# Patient Record
Sex: FEMALE | Race: WHITE | NOT HISPANIC OR LATINO | Employment: FULL TIME
[De-identification: names, ages, dates, MRNs, and addresses within clinical notes are randomized per-mention and may not be internally consistent; named-entity substitution may affect disease eponyms.]

---

## 2017-03-30 DIAGNOSIS — I10 ESSENTIAL HYPERTENSION WITH GOAL BLOOD PRESSURE LESS THAN 140/90: ICD-10-CM

## 2017-03-30 RX ORDER — HYDROCHLOROTHIAZIDE 25 MG/1
TABLET ORAL
Qty: 90 TABLET | Refills: 2 | Status: SHIPPED | OUTPATIENT
Start: 2017-03-30 | End: 2017-10-12

## 2017-03-30 RX ORDER — AMLODIPINE BESYLATE 5 MG/1
TABLET ORAL
Qty: 90 TABLET | Refills: 2 | Status: SHIPPED | OUTPATIENT
Start: 2017-03-30 | End: 2017-10-12

## 2017-04-23 DIAGNOSIS — E78.5 HYPERLIPIDEMIA LDL GOAL <130: ICD-10-CM

## 2017-04-23 NOTE — LETTER
[facility]                                             [address] [place]  [address]    April 25, 2017    Isaura Mcmahan  [address] [place] [address]    Dear Isaura,       We recently received a refill request for simvastatin (ZOCOR).  We have refilled this for a one time 90 day supply only because you are due for a:    Fasting lab appointment    You are also due for a mammogram.      Please call at your earliest convenience so that there will not be a delay with your future refills.          Thank you,   Your [facility] Care Team/winston  [phone]

## 2017-04-24 RX ORDER — SIMVASTATIN 20 MG
TABLET ORAL
Qty: 90 TABLET | Refills: 0 | Status: SHIPPED | OUTPATIENT
Start: 2017-04-24 | End: 2017-07-22

## 2017-05-29 DIAGNOSIS — T50.2X5A DIURETIC-INDUCED HYPOKALEMIA: ICD-10-CM

## 2017-05-29 DIAGNOSIS — E87.6 DIURETIC-INDUCED HYPOKALEMIA: ICD-10-CM

## 2017-05-31 RX ORDER — POTASSIUM CHLORIDE 750 MG/1
TABLET, EXTENDED RELEASE ORAL
Qty: 90 TABLET | Refills: 0 | Status: SHIPPED | OUTPATIENT
Start: 2017-05-31 | End: 2017-08-27

## 2017-07-22 DIAGNOSIS — E78.5 HYPERLIPIDEMIA LDL GOAL <130: ICD-10-CM

## 2017-07-22 NOTE — LETTER
[facility]                                               [address] [place]  [address]    July 24, 2017    Isaura Mcmahan  [address] [place] [address]        Dear Isaura,       We recently received a refill request for simvastatin (ZOCOR) 20 MG tablet.  We have refilled this for a one time 90 day supply only because you are due for a:     Fasting lab appointment        Please call at your earliest convenience so that there will not be a delay with your future refills.        Thank you,   Your [facility] Care Team/bora  [phone]

## 2017-07-24 RX ORDER — SIMVASTATIN 20 MG
TABLET ORAL
Qty: 90 TABLET | Refills: 0 | Status: SHIPPED | OUTPATIENT
Start: 2017-07-24 | End: 2017-10-12

## 2017-07-24 NOTE — TELEPHONE ENCOUNTER
Medication is being filled for 1 time refill only due to:  Patient needs labs fasting lipids.   Janette Shukla RN    Please call to schedule fasting lab appointment.

## 2017-08-27 DIAGNOSIS — E87.6 DIURETIC-INDUCED HYPOKALEMIA: ICD-10-CM

## 2017-08-27 DIAGNOSIS — T50.2X5A DIURETIC-INDUCED HYPOKALEMIA: ICD-10-CM

## 2017-08-27 NOTE — LETTER
[facility]  [address] [facility] [address] [phone]      August 29, 2017    Isaura Mcmahan  [address] [place] [address]    Dear Isaura,       We recently received a refill request for potassium chloride .  We have refilled this for a one time 30 day supply only because you are due for a:    Blood pressure office visit      Please call at your earliest convenience so that there will not be a delay with your future refills.          Thank you,   Your [facility] Care Team/regina  [phone]

## 2017-08-29 RX ORDER — POTASSIUM CHLORIDE 750 MG/1
10 TABLET, EXTENDED RELEASE ORAL DAILY
Qty: 30 TABLET | Refills: 0 | Status: SHIPPED | OUTPATIENT
Start: 2017-08-29 | End: 2017-10-12

## 2017-08-29 NOTE — TELEPHONE ENCOUNTER
Potassium   Date Value Ref Range Status   11/10/2016 3.8 3.4 - 5.3 mmol/L Final   ]  BP Readings from Last 3 Encounters:   11/10/16 139/87   05/19/16 117/73   03/29/16 108/76     Medication refilled per RN protocol.# 30 days  APPT NEEDED FOR FURTHER REFILLS  Please help the pt schedule an appointment hypertension  Health Maintenance Due   Topic Date Due     LIPID MONITORING Q1 YEAR  03/19/2017     MAMMO Q1 YR  04/14/2017     BMP Q6 MOS  05/10/2017     Iris Dill RN

## 2017-09-17 ENCOUNTER — HEALTH MAINTENANCE LETTER (OUTPATIENT)
Age: 52
End: 2017-09-17

## 2017-10-12 ENCOUNTER — RADIANT APPOINTMENT (OUTPATIENT)
Dept: MAMMOGRAPHY | Facility: CLINIC | Age: 52
End: 2017-10-12
Payer: COMMERCIAL

## 2017-10-12 ENCOUNTER — OFFICE VISIT (OUTPATIENT)
Dept: FAMILY MEDICINE | Facility: CLINIC | Age: 52
End: 2017-10-12
Payer: COMMERCIAL

## 2017-10-12 VITALS
SYSTOLIC BLOOD PRESSURE: 128 MMHG | TEMPERATURE: 98.6 F | WEIGHT: 277 LBS | DIASTOLIC BLOOD PRESSURE: 82 MMHG | HEART RATE: 63 BPM | HEIGHT: 67 IN | OXYGEN SATURATION: 95 % | BODY MASS INDEX: 43.47 KG/M2

## 2017-10-12 DIAGNOSIS — E87.6 DIURETIC-INDUCED HYPOKALEMIA: ICD-10-CM

## 2017-10-12 DIAGNOSIS — Z23 NEED FOR PROPHYLACTIC VACCINATION AND INOCULATION AGAINST INFLUENZA: ICD-10-CM

## 2017-10-12 DIAGNOSIS — I10 ESSENTIAL HYPERTENSION WITH GOAL BLOOD PRESSURE LESS THAN 140/90: Primary | ICD-10-CM

## 2017-10-12 DIAGNOSIS — E78.5 HYPERLIPIDEMIA LDL GOAL <130: ICD-10-CM

## 2017-10-12 DIAGNOSIS — T50.2X5A DIURETIC-INDUCED HYPOKALEMIA: ICD-10-CM

## 2017-10-12 DIAGNOSIS — Z12.31 VISIT FOR SCREENING MAMMOGRAM: ICD-10-CM

## 2017-10-12 LAB
ANION GAP SERPL CALCULATED.3IONS-SCNC: 10 MMOL/L (ref 3–14)
BUN SERPL-MCNC: 12 MG/DL (ref 7–30)
CALCIUM SERPL-MCNC: 9.1 MG/DL (ref 8.5–10.1)
CHLORIDE SERPL-SCNC: 104 MMOL/L (ref 94–109)
CHOLEST SERPL-MCNC: 207 MG/DL
CO2 SERPL-SCNC: 27 MMOL/L (ref 20–32)
CREAT SERPL-MCNC: 0.76 MG/DL (ref 0.52–1.04)
GFR SERPL CREATININE-BSD FRML MDRD: 79 ML/MIN/1.7M2
GLUCOSE SERPL-MCNC: 115 MG/DL (ref 70–99)
HDLC SERPL-MCNC: 54 MG/DL
LDLC SERPL CALC-MCNC: 117 MG/DL
NONHDLC SERPL-MCNC: 153 MG/DL
POTASSIUM SERPL-SCNC: 3.4 MMOL/L (ref 3.4–5.3)
SODIUM SERPL-SCNC: 141 MMOL/L (ref 133–144)
TRIGL SERPL-MCNC: 181 MG/DL

## 2017-10-12 PROCEDURE — 80048 BASIC METABOLIC PNL TOTAL CA: CPT | Performed by: PHYSICIAN ASSISTANT

## 2017-10-12 PROCEDURE — 90471 IMMUNIZATION ADMIN: CPT | Performed by: PHYSICIAN ASSISTANT

## 2017-10-12 PROCEDURE — 99214 OFFICE O/P EST MOD 30 MIN: CPT | Mod: 25 | Performed by: PHYSICIAN ASSISTANT

## 2017-10-12 PROCEDURE — 36415 COLL VENOUS BLD VENIPUNCTURE: CPT | Performed by: PHYSICIAN ASSISTANT

## 2017-10-12 PROCEDURE — G0202 SCR MAMMO BI INCL CAD: HCPCS | Mod: TC

## 2017-10-12 PROCEDURE — 80061 LIPID PANEL: CPT | Performed by: PHYSICIAN ASSISTANT

## 2017-10-12 PROCEDURE — 90686 IIV4 VACC NO PRSV 0.5 ML IM: CPT | Performed by: PHYSICIAN ASSISTANT

## 2017-10-12 RX ORDER — HYDROCHLOROTHIAZIDE 25 MG/1
TABLET ORAL
Qty: 90 TABLET | Refills: 1 | Status: SHIPPED | OUTPATIENT
Start: 2017-10-12 | End: 2018-06-12

## 2017-10-12 RX ORDER — AMLODIPINE BESYLATE 5 MG/1
TABLET ORAL
Qty: 90 TABLET | Refills: 1 | Status: SHIPPED | OUTPATIENT
Start: 2017-10-12 | End: 2019-08-06 | Stop reason: ALTCHOICE

## 2017-10-12 RX ORDER — SIMVASTATIN 20 MG
20 TABLET ORAL AT BEDTIME
Qty: 90 TABLET | Refills: 3 | Status: SHIPPED | OUTPATIENT
Start: 2017-10-12 | End: 2018-06-12

## 2017-10-12 RX ORDER — POTASSIUM CHLORIDE 750 MG/1
10 TABLET, EXTENDED RELEASE ORAL DAILY
Qty: 90 TABLET | Refills: 1 | Status: SHIPPED | OUTPATIENT
Start: 2017-10-12 | End: 2018-04-14

## 2017-10-12 NOTE — NURSING NOTE
"Chief Complaint   Patient presents with     Hypertension       Initial /76  Pulse 63  Temp 98.6  F (37  C) (Oral)  Ht 5' 6.5\" (1.689 m)  Wt 277 lb (125.6 kg)  SpO2 95%  BMI 44.04 kg/m2 Estimated body mass index is 44.04 kg/(m^2) as calculated from the following:    Height as of this encounter: 5' 6.5\" (1.689 m).    Weight as of this encounter: 277 lb (125.6 kg).  Medication Reconciliation: complete    RICHA Newton MA    "

## 2017-10-12 NOTE — MR AVS SNAPSHOT
After Visit Summary   10/12/2017    Isaura Mcmahan    MRN: [id number]           Patient Information     Date Of Birth          1965        Visit Information        Provider Department      10/12/2017 8:00 AM Kehr, Kristen M, PA-C [facility]        Today's Diagnoses     Essential hypertension with goal blood pressure less than 140/90    -  1    Diuretic-induced hypokalemia        Hyperlipidemia LDL goal <130        Need for prophylactic vaccination and inoculation against influenza           Follow-ups after your visit        Your next 10 appointments already scheduled     Oct 12, 2017  9:30 AM CDT   MA SCREENING DIGITAL BILATERAL with ANDMA1   [facility] ([facility]    [address] [facility] [address] [phone]           Do not use any powder, lotion or deodorant under your arms or on your breast. If you do, we will ask you to remove it before your exam.  Wear comfortable, two-piece clothing.  If you have any allergies, tell your care team.  Bring any previous mammograms from other facilities or have them mailed to the breast center.              Who to contact     If you have questions or need follow up information about today's clinic visit or your schedule please contact [facility] directly at [phone].  Normal or non-critical lab and imaging results will be communicated to you by MyChart, letter or phone within 4 business days after the clinic has received the results. If you do not hear from us within 7 days, please contact the clinic through MyChart or phone. If you have a critical or abnormal lab result, we will notify you by phone as soon as possible.  Submit refill requests through [employer] or call your pharmacy and they will forward the refill request to us. Please allow 3 business days for your refill to be completed.          Additional Information About Your Visit        MyChart Information     [employer]  "gives you secure access to your electronic health record. If you see a primary care provider, you can also send messages to your care team and make appointments. If you have questions, please call your primary care clinic.  If you do not have a primary care provider, please call [phone] and they will assist you.        Care EveryWhere ID     This is your Care EveryWhere ID. This could be used by other organizations to access your [facility] medical records  [id number]        Your Vitals Were     Pulse Temperature Height Pulse Oximetry BMI (Body Mass Index)       63 98.6  F (37  C) (Oral) 5' 6.5\" (1.689 m) 95% 44.04 kg/m2        Blood Pressure from Last 3 Encounters:   10/12/17 128/82   11/10/16 139/87   05/19/16 117/73    Weight from Last 3 Encounters:   10/12/17 277 lb (125.6 kg)   11/10/16 226 lb (102.5 kg)   05/17/16 229 lb (103.9 kg)              We Performed the Following     BASIC METABOLIC PANEL     FLU VAC, SPLIT VIRUS IM > 3 YO (QUADRIVALENT) [29341]     Lipid panel reflex to direct LDL     Vaccine Administration, Initial [16705]          Today's Medication Changes          These changes are accurate as of: 10/12/17  9:24 AM.  If you have any questions, ask your nurse or doctor.               These medicines have changed or have updated prescriptions.        Dose/Directions    amLODIPine 5 MG tablet   Commonly known as:  NORVASC   This may have changed:  See the new instructions.   Used for:  Essential hypertension with goal blood pressure less than 140/90   Changed by:  Kehr, Kristen M, PA-C        TAKE 1 TABLET(5 MG) BY MOUTH DAILY   Quantity:  90 tablet   Refills:  1       hydrochlorothiazide 25 MG tablet   Commonly known as:  HYDRODIURIL   This may have changed:  See the new instructions.   Used for:  Essential hypertension with goal blood pressure less than 140/90   Changed by:  Kehr, Kristen M, PA-C        TAKE 1 TABLET(25 MG) BY MOUTH DAILY   Quantity:  90 tablet   Refills:  1       simvastatin " 20 MG tablet   Commonly known as:  ZOCOR   This may have changed:  See the new instructions.   Used for:  Hyperlipidemia LDL goal <130   Changed by:  Kehr, Kristen M, PA-C        Dose:  20 mg   Take 1 tablet (20 mg) by mouth At Bedtime   Quantity:  90 tablet   Refills:  3            Where to get your medicines      These medications were sent to [id number] [place], [address] [place] AT [address] [place], [facility] [facility] [place] [address]     Phone:  [phone]     amLODIPine 5 MG tablet    hydrochlorothiazide 25 MG tablet    potassium chloride 10 MEQ tablet    simvastatin 20 MG tablet                Primary Care Provider Office Phone # Fax #    Kristen M Kehr, PA-C [phone] [address] [facility] [address]        Equal Access to Services     [facility]: Hadii aad ku hadasho Soomaali, waaxda luqadaha, qaybta kaalmada adeegyada, waxay idiin hayaan adeflores aleman . So [facility] [phone].    ATENCIÓN: Si habla español, tiene a cote disposición servicios gratuitos de asistencia lingüística. Llame al [phone].    We comply with applicable federal civil rights laws and [place] laws. We do not discriminate on the basis of race, color, national origin, age, disability, sex, sexual orientation, or gender identity.            Thank you!     Thank you for choosing [facility]  for your care. Our goal is always to provide you with excellent care. Hearing back from our patients is one way we can continue to improve our services. Please take a few minutes to complete the written survey that you may receive in the mail after your visit with us. Thank you!             Your Updated Medication List - Protect others around you: Learn how to safely use, store and throw away your medicines at www.disposemymeds.org.          This list is accurate as of: 10/12/17  9:24 AM.  Always use your most recent med list.                    Brand Name Dispense Instructions for use Diagnosis    amLODIPine 5 MG tablet    NORVASC    90 tablet    TAKE 1 TABLET(5 MG) BY MOUTH DAILY    Essential hypertension with goal blood pressure less than 140/90       hydrochlorothiazide 25 MG tablet    HYDRODIURIL    90 tablet    TAKE 1 TABLET(25 MG) BY MOUTH DAILY    Essential hypertension with goal blood pressure less than 140/90       potassium chloride 10 MEQ tablet    K-TAB,KLOR-CON    90 tablet    Take 1 tablet (10 mEq) by mouth daily APPT NEEDED FOR FURTHER REFILLS    Diuretic-induced hypokalemia       simvastatin 20 MG tablet    ZOCOR    90 tablet    Take 1 tablet (20 mg) by mouth At Bedtime    Hyperlipidemia LDL goal <130

## 2017-10-12 NOTE — PROGRESS NOTES
SUBJECTIVE:   Isaura Mcmahan is a 52 year old female who presents to clinic today for the following health issues:      Hyperlipidemia Follow-Up      Rate your low fat/cholesterol diet?: not monitoring fat    Taking statin?  Yes, no muscle aches from statin    Other lipid medications/supplements?:      Hypertension Follow-up      Outpatient blood pressures rare    Low Salt Diet: not monitoring salt          Amount of exercise or physical activity:     Problems taking medications regularly: No    Medication side effects: none  Diet:         Problem list and histories reviewed & adjusted, as indicated.  Additional history: as documented    Patient Active Problem List   Diagnosis     Hyperlipidemia LDL goal <130     Hypertension goal BP (blood pressure) < 140/90     Obesity     CARON (obstructive sleep apnea)- mild (AHI 10)     Past Surgical History:   Procedure Laterality Date     COLONOSCOPY WITH CO2 INSUFFLATION N/A 5/19/2016    Procedure: COLONOSCOPY WITH CO2 INSUFFLATION;  Surgeon: Segundo Crane MD;  Location: [facility] OR     DRAIN PILONIDAL CYST SIMPL  04/2004    left wrist cyst removal     LAPAROSCOPIC TUBAL LIGATION         Social History   Substance Use Topics     Smoking status: Never Smoker     Smokeless tobacco: Never Used      Comment: per chart     Alcohol use Yes      Comment: occassional     Family History   Problem Relation Age of Onset     Hypertension Mother      CANCER Father      prostate     Prostate Cancer Father      DIABETES Maternal Grandmother      CANCER Brother      testicular     Other Cancer Brother      testicular/testicular         Current Outpatient Prescriptions   Medication Sig Dispense Refill     potassium chloride (K-TAB,KLOR-CON) 10 MEQ tablet Take 1 tablet (10 mEq) by mouth daily APPT NEEDED FOR FURTHER REFILLS 30 tablet 0     simvastatin (ZOCOR) 20 MG tablet TAKE 1 TABLET BY MOUTH AT BEDTIME 90 tablet 0     hydrochlorothiazide (HYDRODIURIL) 25 MG tablet TAKE 1 TABLET(25  "MG) BY MOUTH DAILY 90 tablet 2     amLODIPine (NORVASC) 5 MG tablet TAKE 1 TABLET(5 MG) BY MOUTH DAILY 90 tablet 2     [DISCONTINUED] hydrochlorothiazide (HYDRODIURIL) 25 MG tablet Take 1 tablet (25 mg) by mouth daily 90 tablet 1     [DISCONTINUED] amLODIPine (NORVASC) 5 MG tablet Take 1 tablet (5 mg) by mouth daily 90 tablet 1     Allergies   Allergen Reactions     Amoxicillin Hives         Reviewed and updated as needed this visit by clinical staff  Tobacco  Allergies  Meds  Med Hx  Surg Hx  Fam Hx  Soc Hx      Reviewed and updated as needed this visit by Provider         ROS:  Constitutional, HEENT, cardiovascular, pulmonary, gi and gu systems are negative, except as otherwise noted.      OBJECTIVE:   /82  Pulse 63  Temp 98.6  F (37  C) (Oral)  Ht 5' 6.5\" (1.689 m)  Wt 277 lb (125.6 kg)  SpO2 95%  BMI 44.04 kg/m2  Body mass index is 44.04 kg/(m^2).  GENERAL: healthy, alert and no distress  RESP: lungs clear to auscultation - no rales, rhonchi or wheezes  CV: regular rate and rhythm, normal S1 S2, no S3 or S4, no murmur, click or rub, no peripheral edema and peripheral pulses strong  MS: no gross musculoskeletal defects noted, no edema  SKIN: no suspicious lesions or rashes  PSYCH: mentation appears normal, affect normal/bright    Diagnostic Test Results:  none     ASSESSMENT/PLAN:           1. Essential hypertension with goal blood pressure less than 140/90  Stable, refills given x 6 months.   Okay to follow up at that time with RN HTN  She has gained weight in the past 6 months. She is working on getting back in a routine with healthy habits. This will also help her blood pressure  - amLODIPine (NORVASC) 5 MG tablet; TAKE 1 TABLET(5 MG) BY MOUTH DAILY  Dispense: 90 tablet; Refill: 1  - hydrochlorothiazide (HYDRODIURIL) 25 MG tablet; TAKE 1 TABLET(25 MG) BY MOUTH DAILY  Dispense: 90 tablet; Refill: 1  - BASIC METABOLIC PANEL    2. Diuretic-induced hypokalemia  Stable, Renal panel done today.   - " potassium chloride (K-TAB,KLOR-CON) 10 MEQ tablet; Take 1 tablet (10 mEq) by mouth daily APPT NEEDED FOR FURTHER REFILLS  Dispense: 90 tablet; Refill: 1    3. Hyperlipidemia LDL goal <130  Lipids done today.   - simvastatin (ZOCOR) 20 MG tablet; Take 1 tablet (20 mg) by mouth At Bedtime  Dispense: 90 tablet; Refill: 3  - Lipid panel reflex to direct LDL    4. Need for prophylactic vaccination and inoculation against influenza  - FLU VAC, SPLIT VIRUS IM > 3 YO (QUADRIVALENT) [98696]  - Vaccine Administration, Initial [19896]        Kristen M. Kehr, PA-C  [facility]  Injectable Influenza Immunization Documentation    1.  Is the person to be vaccinated sick today?   No    2. Does the person to be vaccinated have an allergy to a component   of the vaccine?   No    3. Has the person to be vaccinated ever had a serious reaction   to influenza vaccine in the past?   No    4. Has the person to be vaccinated ever had Guillain-Barré syndrome?   No    Form completed by Demetrius HOLT MA

## 2018-03-07 ENCOUNTER — OFFICE VISIT (OUTPATIENT)
Dept: FAMILY MEDICINE | Facility: CLINIC | Age: 53
End: 2018-03-07
Payer: COMMERCIAL

## 2018-03-07 ENCOUNTER — RADIANT APPOINTMENT (OUTPATIENT)
Dept: GENERAL RADIOLOGY | Facility: CLINIC | Age: 53
End: 2018-03-07
Attending: FAMILY MEDICINE
Payer: COMMERCIAL

## 2018-03-07 VITALS
TEMPERATURE: 98.9 F | DIASTOLIC BLOOD PRESSURE: 81 MMHG | BODY MASS INDEX: 44.04 KG/M2 | HEART RATE: 78 BPM | RESPIRATION RATE: 14 BRPM | OXYGEN SATURATION: 98 % | SYSTOLIC BLOOD PRESSURE: 143 MMHG | WEIGHT: 277 LBS

## 2018-03-07 DIAGNOSIS — S80.02XA CONTUSION OF LEFT KNEE, INITIAL ENCOUNTER: ICD-10-CM

## 2018-03-07 DIAGNOSIS — S80.02XA CONTUSION OF LEFT KNEE, INITIAL ENCOUNTER: Primary | ICD-10-CM

## 2018-03-07 PROCEDURE — 99214 OFFICE O/P EST MOD 30 MIN: CPT | Performed by: FAMILY MEDICINE

## 2018-03-07 PROCEDURE — 73560 X-RAY EXAM OF KNEE 1 OR 2: CPT | Mod: LT

## 2018-03-07 ASSESSMENT — PAIN SCALES - GENERAL: PAINLEVEL: NO PAIN (0)

## 2018-03-07 NOTE — NURSING NOTE
"Chief Complaint   Patient presents with     Fall     Knee Pain       Initial /81  Pulse 78  Temp 98.9  F (37.2  C) (Oral)  Resp 14  Wt 277 lb (125.6 kg)  SpO2 98%  BMI 44.04 kg/m2 Estimated body mass index is 44.04 kg/(m^2) as calculated from the following:    Height as of 10/12/17: 5' 6.5\" (1.689 m).    Weight as of this encounter: 277 lb (125.6 kg).  Medication Reconciliation: complete    RICHA Newton MA    "

## 2018-03-07 NOTE — MR AVS SNAPSHOT
After Visit Summary   3/7/2018    Isaura Mcmahan    MRN: [id number]           Patient Information     Date Of Birth          1965        Visit Information        Provider Department      3/7/2018 4:00 PM Shane Alonso MD [facility]        Today's Diagnoses     Contusion of left knee, initial encounter    -  1       Follow-ups after your visit        Who to contact     If you have questions or need follow up information about today's clinic visit or your schedule please contact [facility] directly at [phone].  Normal or non-critical lab and imaging results will be communicated to you by Kuaidi Dachehart, letter or phone within 4 business days after the clinic has received the results. If you do not hear from us within 7 days, please contact the clinic through [employer] or phone. If you have a critical or abnormal lab result, we will notify you by phone as soon as possible.  Submit refill requests through [employer] or call your pharmacy and they will forward the refill request to us. Please allow 3 business days for your refill to be completed.          Additional Information About Your Visit        MyChart Information     [employer] gives you secure access to your electronic health record. If you see a primary care provider, you can also send messages to your care team and make appointments. If you have questions, please call your primary care clinic.  If you do not have a primary care provider, please call [phone] and they will assist you.        Care EveryWhere ID     This is your Care EveryWhere ID. This could be used by other organizations to access your [facility] medical records  [id number]        Your Vitals Were     Pulse Temperature Respirations Pulse Oximetry BMI (Body Mass Index)       78 98.9  F (37.2  C) (Oral) 14 98% 44.04 kg/m2        Blood Pressure from Last 3 Encounters:   03/07/18 143/81   10/12/17 128/82   11/10/16 139/87    Weight from Last 3  Encounters:   03/07/18 277 lb (125.6 kg)   10/12/17 277 lb (125.6 kg)   11/10/16 226 lb (102.5 kg)               Primary Care Provider Office Phone # Fax #    Kristen M Kehr, PA-C [phone] [address] [facility] [address]        Equal Access to Services     [facility] : Hadii aad ku hadasho Soomaali, waaxda luqadaha, qaybta kaalmada adeegyada, waxay idiin hayaan adeeg kharash la'aan . So [facility] [phone].    ATENCIÓN: Si habla español, tiene a cote disposición servicios gratuitos de asistencia lingüística. Pankajame al [phone].    We comply with applicable federal civil rights laws and [place] laws. We do not discriminate on the basis of race, color, national origin, age, disability, sex, sexual orientation, or gender identity.            Thank you!     Thank you for choosing [facility]  for your care. Our goal is always to provide you with excellent care. Hearing back from our patients is one way we can continue to improve our services. Please take a few minutes to complete the written survey that you may receive in the mail after your visit with us. Thank you!             Your Updated Medication List - Protect others around you: Learn how to safely use, store and throw away your medicines at www.disposemymeds.org.          This list is accurate as of 3/7/18  5:23 PM.  Always use your most recent med list.                   Brand Name Dispense Instructions for use Diagnosis    amLODIPine 5 MG tablet    NORVASC    90 tablet    TAKE 1 TABLET(5 MG) BY MOUTH DAILY    Essential hypertension with goal blood pressure less than 140/90       hydrochlorothiazide 25 MG tablet    HYDRODIURIL    90 tablet    TAKE 1 TABLET(25 MG) BY MOUTH DAILY    Essential hypertension with goal blood pressure less than 140/90       potassium chloride 10 MEQ tablet    K-TAB,KLOR-CON    90 tablet    Take 1 tablet (10 mEq) by mouth daily APPT NEEDED FOR FURTHER REFILLS    Diuretic-induced  hypokalemia       simvastatin 20 MG tablet    ZOCOR    90 tablet    Take 1 tablet (20 mg) by mouth At Bedtime    Hyperlipidemia LDL goal <130

## 2018-03-07 NOTE — PROGRESS NOTES
SUBJECTIVE:   Isaura Mcmahan is a 52 year old female who presents to clinic today for the following health issues:      Fill on ice in 01/2018, left knee numbness, swelling, and pain with pressure.   SUBJECTIVE:  Isaura Mcmahan is a 52 year old female who presents to the clinic today for left knee pain.  Onset of symptoms: 2 months ago.  History of injury: contused by slipping on ice and landing on the proximal tibia anterior  Associated symptoms: swelling:   Location of pain: anterior  Symptoms are: Unchanged  Improving  History of serious knee problems: no  Medications and therapies tried: no medicine    What makes it worse: kneeling    Past Medical, social, family histories, medications, and allergies reviewed and updated    OBJECTIVE:  Blood pressure 143/81, pulse 78, temperature 98.9  F (37.2  C), temperature source Oral, resp. rate 14, weight 277 lb (125.6 kg), SpO2 98 %, not currently breastfeeding.  Patient appears to be in alert and in no apparent distress distress  KNEE EXAM (left)  Effusion: yes  Erythema: no  Patellar tenderness: no  Medial joint line tenderness: no  Lateral joint line tenderness: no  Lachman's test: negative  Beatriz's maneuver: negative  Valgus:negative  Varus:negative  range of motion: full range of motion  Calves soft non-tender.  Neurovascularly Intact Distally.      X-rays: normal: no effusions, fractures, spurring, joint space narrowing or loose bodies    ICD-10-CM    1. Contusion of left knee, initial encounter S80.02XA XR Knee Left 1/2 Views    PLAN:Recheck one month if not improved

## 2018-05-13 ENCOUNTER — TELEPHONE (OUTPATIENT)
Dept: FAMILY MEDICINE | Facility: CLINIC | Age: 53
End: 2018-05-13

## 2018-05-13 DIAGNOSIS — E87.6 DIURETIC-INDUCED HYPOKALEMIA: ICD-10-CM

## 2018-05-13 DIAGNOSIS — I10 HYPERTENSION GOAL BP (BLOOD PRESSURE) < 140/90: Primary | Chronic | ICD-10-CM

## 2018-05-13 DIAGNOSIS — T50.2X5A DIURETIC-INDUCED HYPOKALEMIA: ICD-10-CM

## 2018-05-14 RX ORDER — POTASSIUM CHLORIDE 750 MG/1
TABLET, EXTENDED RELEASE ORAL
Qty: 30 TABLET | Refills: 0 | OUTPATIENT
Start: 2018-05-14

## 2018-05-15 NOTE — TELEPHONE ENCOUNTER
Patient advise at last refill, appointment needed for further refills  No pending appointment.  Please advise on refill.   Iris Dill RN

## 2018-05-21 NOTE — TELEPHONE ENCOUNTER
A letter was sent last month with no response. , please contact this patient and schedule an appointment for medication check and lab tests. I can extend a refill for the medication until she has her appointment.   Kristen Kehr PA-C

## 2018-05-23 RX ORDER — POTASSIUM CHLORIDE 750 MG/1
10 TABLET, EXTENDED RELEASE ORAL DAILY
Qty: 20 TABLET | Refills: 0 | Status: SHIPPED | OUTPATIENT
Start: 2018-05-23 | End: 2018-06-12

## 2018-05-23 NOTE — TELEPHONE ENCOUNTER
RN please refill medication, patient has an appointment 06/12/18 will come fasting.  MARQUIS Petty

## 2018-05-24 ENCOUNTER — TELEPHONE (OUTPATIENT)
Dept: FAMILY MEDICINE | Facility: CLINIC | Age: 53
End: 2018-05-24

## 2018-05-24 NOTE — TELEPHONE ENCOUNTER
Panel Management Review      Patient has the following on her problem list:     Hypertension   Last three blood pressure readings:  BP Readings from Last 3 Encounters:   03/07/18 143/81   10/12/17 128/82   11/10/16 139/87     Blood pressure: FAILED    HTN Guidelines:  Age 18-59 BP range:  Less than 140/90  Age 60-85 with Diabetes:  Less than 140/90  Age 60-85 without Diabetes:  less than 150/90      Composite cancer screening  Chart review shows that this patient is due/due soon for the following None  Summary:    Patient is due/failing the following:   BP CHECK    Action needed:   Patient has an appointment scheduled to see Kristen Kehr PA-C on 06/12/18.    Type of outreach:    none    Questions for provider review:    None                                                                                                                                    Demetrius HOLT MA       Chart routed to close .

## 2018-06-12 ENCOUNTER — OFFICE VISIT (OUTPATIENT)
Dept: FAMILY MEDICINE | Facility: CLINIC | Age: 53
End: 2018-06-12
Payer: COMMERCIAL

## 2018-06-12 VITALS
SYSTOLIC BLOOD PRESSURE: 102 MMHG | WEIGHT: 261 LBS | TEMPERATURE: 98.7 F | RESPIRATION RATE: 12 BRPM | OXYGEN SATURATION: 95 % | BODY MASS INDEX: 41.5 KG/M2 | HEART RATE: 60 BPM | DIASTOLIC BLOOD PRESSURE: 70 MMHG

## 2018-06-12 DIAGNOSIS — E87.6 DIURETIC-INDUCED HYPOKALEMIA: ICD-10-CM

## 2018-06-12 DIAGNOSIS — E78.5 HYPERLIPIDEMIA LDL GOAL <130: ICD-10-CM

## 2018-06-12 DIAGNOSIS — T50.2X5A DIURETIC-INDUCED HYPOKALEMIA: ICD-10-CM

## 2018-06-12 DIAGNOSIS — I10 ESSENTIAL HYPERTENSION WITH GOAL BLOOD PRESSURE LESS THAN 140/90: ICD-10-CM

## 2018-06-12 LAB
ANION GAP SERPL CALCULATED.3IONS-SCNC: 10 MMOL/L (ref 3–14)
BUN SERPL-MCNC: 15 MG/DL (ref 7–30)
CALCIUM SERPL-MCNC: 9.3 MG/DL (ref 8.5–10.1)
CHLORIDE SERPL-SCNC: 107 MMOL/L (ref 94–109)
CHOLEST SERPL-MCNC: 205 MG/DL
CO2 SERPL-SCNC: 26 MMOL/L (ref 20–32)
CREAT SERPL-MCNC: 0.71 MG/DL (ref 0.52–1.04)
GFR SERPL CREATININE-BSD FRML MDRD: 85 ML/MIN/1.7M2
GLUCOSE SERPL-MCNC: 121 MG/DL (ref 70–99)
HDLC SERPL-MCNC: 59 MG/DL
LDLC SERPL CALC-MCNC: 119 MG/DL
NONHDLC SERPL-MCNC: 146 MG/DL
POTASSIUM SERPL-SCNC: 3.2 MMOL/L (ref 3.4–5.3)
SODIUM SERPL-SCNC: 143 MMOL/L (ref 133–144)
TRIGL SERPL-MCNC: 136 MG/DL

## 2018-06-12 PROCEDURE — 80061 LIPID PANEL: CPT | Performed by: PHYSICIAN ASSISTANT

## 2018-06-12 PROCEDURE — 80048 BASIC METABOLIC PNL TOTAL CA: CPT | Performed by: PHYSICIAN ASSISTANT

## 2018-06-12 PROCEDURE — 36415 COLL VENOUS BLD VENIPUNCTURE: CPT | Performed by: PHYSICIAN ASSISTANT

## 2018-06-12 PROCEDURE — 99213 OFFICE O/P EST LOW 20 MIN: CPT | Performed by: PHYSICIAN ASSISTANT

## 2018-06-12 PROCEDURE — 82306 VITAMIN D 25 HYDROXY: CPT | Performed by: PHYSICIAN ASSISTANT

## 2018-06-12 RX ORDER — POTASSIUM CHLORIDE 750 MG/1
10 TABLET, EXTENDED RELEASE ORAL DAILY
Qty: 90 TABLET | Refills: 1 | Status: SHIPPED | OUTPATIENT
Start: 2018-06-12 | End: 2018-12-28

## 2018-06-12 RX ORDER — AMLODIPINE BESYLATE 5 MG/1
TABLET ORAL
Qty: 90 TABLET | Refills: 1 | Status: CANCELLED | OUTPATIENT
Start: 2018-06-12

## 2018-06-12 RX ORDER — AMLODIPINE BESYLATE 2.5 MG/1
2.5 TABLET ORAL DAILY
Qty: 90 TABLET | Refills: 1 | Status: SHIPPED | OUTPATIENT
Start: 2018-06-12 | End: 2018-12-01

## 2018-06-12 RX ORDER — HYDROCHLOROTHIAZIDE 25 MG/1
TABLET ORAL
Qty: 90 TABLET | Refills: 1 | Status: SHIPPED | OUTPATIENT
Start: 2018-06-12 | End: 2018-12-01

## 2018-06-12 RX ORDER — SIMVASTATIN 20 MG
20 TABLET ORAL AT BEDTIME
Qty: 90 TABLET | Refills: 3 | Status: SHIPPED | OUTPATIENT
Start: 2018-06-12 | End: 2019-07-02

## 2018-06-12 ASSESSMENT — PAIN SCALES - GENERAL: PAINLEVEL: NO PAIN (0)

## 2018-06-12 NOTE — NURSING NOTE
"Chief Complaint   Patient presents with     Health Maintenance     Hypertension     Lipids       Initial /70  Pulse 60  Temp 98.7  F (37.1  C) (Oral)  Resp 12  Wt 261 lb (118.4 kg)  SpO2 95%  BMI 41.5 kg/m2 Estimated body mass index is 41.5 kg/(m^2) as calculated from the following:    Height as of 10/12/17: 5' 6.5\" (1.689 m).    Weight as of this encounter: 261 lb (118.4 kg).  Medication Reconciliation: complete    RICHA Newton MA    "

## 2018-06-12 NOTE — MR AVS SNAPSHOT
After Visit Summary   6/12/2018    Isaura Mcmahan    MRN: [id number]           Patient Information     Date Of Birth          1965        Visit Information        Provider Department      6/12/2018 8:00 AM Kehr, Kristen M, PA-C [facility]        Today's Diagnoses     Essential hypertension with goal blood pressure less than 140/90        Diuretic-induced hypokalemia        Hyperlipidemia LDL goal <130           Follow-ups after your visit        Who to contact     If you have questions or need follow up information about today's clinic visit or your schedule please contact [facility] directly at [phone].  Normal or non-critical lab and imaging results will be communicated to you by [employer], letter or phone within 4 business days after the clinic has received the results. If you do not hear from us within 7 days, please contact the clinic through [employer] or phone. If you have a critical or abnormal lab result, we will notify you by phone as soon as possible.  Submit refill requests through [employer] or call your pharmacy and they will forward the refill request to us. Please allow 3 business days for your refill to be completed.          Additional Information About Your Visit        MyChart Information     [employer] gives you secure access to your electronic health record. If you see a primary care provider, you can also send messages to your care team and make appointments. If you have questions, please call your primary care clinic.  If you do not have a primary care provider, please call [phone] and they will assist you.        Care EveryWhere ID     This is your Care EveryWhere ID. This could be used by other organizations to access your [facility] medical records  [id number]        Your Vitals Were     Pulse Temperature Respirations Pulse Oximetry BMI (Body Mass Index)       60 98.7  F (37.1  C) (Oral) 12 95% 41.5 kg/m2        Blood Pressure from Last 3  Encounters:   06/12/18 102/70   03/07/18 143/81   10/12/17 128/82    Weight from Last 3 Encounters:   06/12/18 261 lb (118.4 kg)   03/07/18 277 lb (125.6 kg)   10/12/17 277 lb (125.6 kg)              We Performed the Following     25 OH Vit D therapy monitoring     BASIC METABOLIC PANEL     Lipid panel reflex to direct LDL Fasting          Today's Medication Changes          These changes are accurate as of 6/12/18  8:35 AM.  If you have any questions, ask your nurse or doctor.               These medicines have changed or have updated prescriptions.        Dose/Directions    * amLODIPine 5 MG tablet   Commonly known as:  NORVASC   This may have changed:  Another medication with the same name was added. Make sure you understand how and when to take each.   Used for:  Essential hypertension with goal blood pressure less than 140/90        TAKE 1 TABLET(5 MG) BY MOUTH DAILY   Quantity:  90 tablet   Refills:  1       * amLODIPine 2.5 MG tablet   Commonly known as:  NORVASC   This may have changed:  You were already taking a medication with the same name, and this prescription was added. Make sure you understand how and when to take each.   Used for:  Essential hypertension with goal blood pressure less than 140/90        Dose:  2.5 mg   Take 1 tablet (2.5 mg) by mouth daily   Quantity:  90 tablet   Refills:  1       * Notice:  This list has 2 medication(s) that are the same as other medications prescribed for you. Read the directions carefully, and ask your doctor or other care provider to review them with you.         Where to get your medicines      These medications were sent to [id number] - [address]ONNA ABEL [facility] [address] AT [address] [place], [place] [address]     Phone:  [phone]     amLODIPine 2.5 MG tablet    hydrochlorothiazide 25 MG tablet    potassium chloride 10 MEQ tablet    simvastatin 20 MG tablet                Primary Care Provider  Office Phone # Fax #    Kristen M Kehr, PA-C [phone] [id number] [facility] [address]        Equal Access to Services     TYRESE HINKLE : Hadii gerry ku hadjennyo Soomaali, waaxda luqadaha, qaybta kaalmada adeegyada, soni puentes laRaffiaydee caballero. So [facility] [phone].    ATENCIÓN: Si habla español, tiene a cote disposición servicios gratuitos de asistencia lingüística. Llame al [phone].    We comply with applicable federal civil rights laws and [place] laws. We do not discriminate on the basis of race, color, national origin, age, disability, sex, sexual orientation, or gender identity.            Thank you!     Thank you for choosing [facility]  for your care. Our goal is always to provide you with excellent care. Hearing back from our patients is one way we can continue to improve our services. Please take a few minutes to complete the written survey that you may receive in the mail after your visit with us. Thank you!             Your Updated Medication List - Protect others around you: Learn how to safely use, store and throw away your medicines at www.disposemymeds.org.          This list is accurate as of 6/12/18  8:35 AM.  Always use your most recent med list.                   Brand Name Dispense Instructions for use Diagnosis    * amLODIPine 5 MG tablet    NORVASC    90 tablet    TAKE 1 TABLET(5 MG) BY MOUTH DAILY    Essential hypertension with goal blood pressure less than 140/90       * amLODIPine 2.5 MG tablet    NORVASC    90 tablet    Take 1 tablet (2.5 mg) by mouth daily    Essential hypertension with goal blood pressure less than 140/90       hydrochlorothiazide 25 MG tablet    HYDRODIURIL    90 tablet    TAKE 1 TABLET(25 MG) BY MOUTH DAILY    Essential hypertension with goal blood pressure less than 140/90       potassium chloride 10 MEQ tablet    K-TAB,KLOR-CON    90 tablet    Take 1 tablet (10 mEq) by mouth daily Due now for office visit  with provider for her high blood pressure; last refill    Diuretic-induced hypokalemia       simvastatin 20 MG tablet    ZOCOR    90 tablet    Take 1 tablet (20 mg) by mouth At Bedtime    Hyperlipidemia LDL goal <130       * Notice:  This list has 2 medication(s) that are the same as other medications prescribed for you. Read the directions carefully, and ask your doctor or other care provider to review them with you.

## 2018-06-12 NOTE — PROGRESS NOTES
SUBJECTIVE:   Isaura Mcmahan is a 53 year old female who presents to clinic today for the following health issues:    Isaura has been working hard and losing weight. She feels great.   Her blood pressure is better. She is fasting today. She continues to take norvasc, hctz and simvastatin. She is also on potassium.     History of Present Illness     Hyperlipidemia:     Low fat/chol diet rating::  Fair    Taking Statins::  YES    Lipid Medications or Supplements::  Fish oil/Omega 3, without side effects. and Other    Hypertension:     Outpatient blood pressures:  Are not being checked    Dietary sodium intake::  No added salt diet    Diet:  Regular (no restrictions)  Frequency of exercise:  None  Taking medications regularly:  Yes  Medication side effects:  None  Additional concerns today:  YES    Problem list and histories reviewed & adjusted, as indicated.  Additional history: as documented      Patient Active Problem List   Diagnosis     Hyperlipidemia LDL goal <130     Hypertension goal BP (blood pressure) < 140/90     Obesity     CARON (obstructive sleep apnea)- mild (AHI 10)     Past Surgical History:   Procedure Laterality Date     COLONOSCOPY       COLONOSCOPY WITH CO2 INSUFFLATION N/A 5/19/2016    Procedure: COLONOSCOPY WITH CO2 INSUFFLATION;  Surgeon: Segundo Crane MD;  Location: [facility] OR     DRAIN PILONIDAL CYST SIMPL  04/2004    left wrist cyst removal     LAPAROSCOPIC TUBAL LIGATION         Social History   Substance Use Topics     Smoking status: Never Smoker     Smokeless tobacco: Never Used      Comment: per chart     Alcohol use Yes      Comment: occassional     Family History   Problem Relation Age of Onset     Hypertension Mother      Hyperlipidemia Mother      Other Cancer Mother      Lung and Brain     CANCER Father      prostate     Prostate Cancer Father      Other Cancer Father      bone     DIABETES Maternal Grandmother      CANCER Brother      testicular     Other Cancer Brother       testicular/testicular     Hypertension Brother      Hyperlipidemia Brother      Prostate Cancer Brother          Allergies   Allergen Reactions     Amoxicillin Hives     BP Readings from Last 3 Encounters:   06/12/18 102/70   03/07/18 143/81   10/12/17 128/82    Wt Readings from Last 3 Encounters:   06/12/18 261 lb (118.4 kg)   03/07/18 277 lb (125.6 kg)   10/12/17 277 lb (125.6 kg)                    ROS:  Constitutional, HEENT, cardiovascular, pulmonary, GI, , musculoskeletal, neuro, skin, endocrine and psych systems are negative, except as otherwise noted.    OBJECTIVE:     /70  Pulse 60  Temp 98.7  F (37.1  C) (Oral)  Resp 12  Wt 261 lb (118.4 kg)  SpO2 95%  BMI 41.5 kg/m2  Body mass index is 41.5 kg/(m^2).  GENERAL: healthy, alert and no distress  EYES: Eyes grossly normal to inspection, PERRL and conjunctivae and sclerae normal  RESP: lungs clear to auscultation - no rales, rhonchi or wheezes  CV: regular rate and rhythm, normal S1 S2, no S3 or S4, no murmur, click or rub, no peripheral edema and peripheral pulses strong  MS: no gross musculoskeletal defects noted, no edema  SKIN: no suspicious lesions or rashes  NEURO: Normal strength and tone, mentation intact and speech normal  PSYCH: mentation appears normal, affect normal/bright    Diagnostic Test Results:  none     ASSESSMENT/PLAN:       1. Essential hypertension with goal blood pressure less than 140/90  She is making progress with lifestyle changes and weight loss. She is going to cut the amlodipine to 2.5 mg and continue to monitor her blood pressure, she may get off of it entirely if blood pressure is lower than 140/90.   - BASIC METABOLIC PANEL  - hydrochlorothiazide (HYDRODIURIL) 25 MG tablet; TAKE 1 TABLET(25 MG) BY MOUTH DAILY  Dispense: 90 tablet; Refill: 1  - 25 OH Vit D therapy monitoring  - amLODIPine (NORVASC) 2.5 MG tablet; Take 1 tablet (2.5 mg) by mouth daily  Dispense: 90 tablet; Refill: 1    2. Diuretic-induced  hypokalemia  Check renal panel.   - potassium chloride (K-TAB,KLOR-CON) 10 MEQ tablet; Take 1 tablet (10 mEq) by mouth daily Due now for office visit with provider for her high blood pressure; last refill  Dispense: 90 tablet; Refill: 1    3. Hyperlipidemia LDL goal <130  - simvastatin (ZOCOR) 20 MG tablet; Take 1 tablet (20 mg) by mouth At Bedtime  Dispense: 90 tablet; Refill: 3  - Lipid panel reflex to direct LDL Fasting    Follow up in 6 months.       Kristen M. Kehr, PA-C  [facility]

## 2018-06-13 DIAGNOSIS — E87.6 HYPOKALEMIA: Primary | ICD-10-CM

## 2018-06-15 LAB
DEPRECATED CALCIDIOL+CALCIFEROL SERPL-MC: <22 UG/L (ref 20–75)
VITAMIN D2 SERPL-MCNC: <5 UG/L
VITAMIN D3 SERPL-MCNC: 17 UG/L

## 2018-12-01 DIAGNOSIS — I10 ESSENTIAL HYPERTENSION WITH GOAL BLOOD PRESSURE LESS THAN 140/90: ICD-10-CM

## 2018-12-01 NOTE — LETTER
December 3, 2018    Isaura WRIGHT [facility]  [address] [place] [address]    Dear Isaura,       We recently received a refill request for Medications.  We have refilled this for a one time 30 day supply only because you are due for a:    Blood Pressure office visit and Non-Fasting lab appointment      Please schedule this lab appointment 4-5 days prior to the office visit.     Please call at your earliest convenience so that there will not be a delay with your future refills.          Thank you,   Your [facility] Team/mkr  [phone]

## 2018-12-01 NOTE — LETTER
December 3, 2018    Isaura WRIGHT [facility]  [address] [place] [place] [address]    Dear Isaura,       We recently received a refill request for Medications.  We have refilled this for a one time 30 day supply only because you are due for a:     Non-Fasting lab appointment        Please call at your earliest convenience so that there will not be a delay with your future refills.        Thank you,   Your [facility] Team/mkr  [phone]

## 2018-12-03 RX ORDER — AMLODIPINE BESYLATE 2.5 MG/1
TABLET ORAL
Qty: 30 TABLET | Refills: 0 | Status: SHIPPED | OUTPATIENT
Start: 2018-12-03 | End: 2018-12-28

## 2018-12-03 RX ORDER — HYDROCHLOROTHIAZIDE 25 MG/1
TABLET ORAL
Qty: 30 TABLET | Refills: 0 | Status: SHIPPED | OUTPATIENT
Start: 2018-12-03 | End: 2018-12-28

## 2018-12-03 NOTE — TELEPHONE ENCOUNTER
She is due for an office visit.   Please schedule the office visit, she does need a potassium check and should have that done prior to her visit. She does not need to be fasting for this test. Orders are in.   I am fine with a 30 day refill of the medications.   Thank you.    Kristen Kehr PA-C

## 2018-12-03 NOTE — TELEPHONE ENCOUNTER
New letter sent previous letter voided. Office visit and labs needed for further refills.  MARQUIS Petty

## 2018-12-03 NOTE — TELEPHONE ENCOUNTER
Please review and sign Pending  Labs in [place].patient needs labs for future refills  Brianne CHO

## 2018-12-14 DIAGNOSIS — T50.2X5A DIURETIC-INDUCED HYPOKALEMIA: ICD-10-CM

## 2018-12-14 DIAGNOSIS — E87.6 DIURETIC-INDUCED HYPOKALEMIA: ICD-10-CM

## 2018-12-14 RX ORDER — POTASSIUM CHLORIDE 750 MG/1
TABLET, EXTENDED RELEASE ORAL
Qty: 90 TABLET | Refills: 0 | OUTPATIENT
Start: 2018-12-14

## 2018-12-28 DIAGNOSIS — T50.2X5A DIURETIC-INDUCED HYPOKALEMIA: ICD-10-CM

## 2018-12-28 DIAGNOSIS — E87.6 DIURETIC-INDUCED HYPOKALEMIA: ICD-10-CM

## 2018-12-28 RX ORDER — POTASSIUM CHLORIDE 750 MG/1
10 TABLET, EXTENDED RELEASE ORAL DAILY
Qty: 30 TABLET | Refills: 0 | Status: SHIPPED | OUTPATIENT
Start: 2018-12-28 | End: 2019-01-20

## 2018-12-28 NOTE — TELEPHONE ENCOUNTER
Reason for Call:  Medication or medication refill:    Do you use a [facility]?  Name of the pharmacy and phone number for the current request:  [facility] [phone]    Name of the medication requested: Potassium Chloride    Other request: She is completely out of medication    Can we leave a detailed message on this number? YES    Phone number patient can be reached at: Home number on file [phone] (home)    Best Time: any     Call taken on 12/28/2018 at 11:23 AM by Siobhan Morales

## 2018-12-28 NOTE — TELEPHONE ENCOUNTER
Routing refill request to provider for review/approval because:  Mara given x1 and patient did not follow up, please advise  Patient is now scheduled for office visit on 1/3/19.    Requested Prescriptions   Pending Prescriptions Disp Refills     potassium chloride ER (K-TAB/KLOR-CON) 10 MEQ CR tablet 30 tablet 0     Sig: Take 1 tablet (10 mEq) by mouth daily Due now for office visit with provider for her high blood pressure; last refill    Potassium Supplements Protocol Failed - 12/28/2018 12:05 PM       Failed - Normal serum potassium in past 12 months    Recent Labs   Lab Test 06/12/18  0819   POTASSIUM 3.2*        Batool Willoughby RN

## 2018-12-29 DIAGNOSIS — E87.6 HYPOKALEMIA: ICD-10-CM

## 2018-12-29 PROCEDURE — 36415 COLL VENOUS BLD VENIPUNCTURE: CPT | Performed by: PHYSICIAN ASSISTANT

## 2018-12-29 PROCEDURE — 84132 ASSAY OF SERUM POTASSIUM: CPT | Performed by: PHYSICIAN ASSISTANT

## 2018-12-31 LAB — POTASSIUM SERPL-SCNC: 3.4 MMOL/L (ref 3.4–5.3)

## 2019-01-20 DIAGNOSIS — E87.6 DIURETIC-INDUCED HYPOKALEMIA: ICD-10-CM

## 2019-01-20 DIAGNOSIS — T50.2X5A DIURETIC-INDUCED HYPOKALEMIA: ICD-10-CM

## 2019-01-22 RX ORDER — POTASSIUM CHLORIDE 750 MG/1
TABLET, EXTENDED RELEASE ORAL
Qty: 90 TABLET | Refills: 1 | Status: SHIPPED | OUTPATIENT
Start: 2019-01-22 | End: 2019-07-16

## 2019-04-02 DIAGNOSIS — I10 ESSENTIAL HYPERTENSION WITH GOAL BLOOD PRESSURE LESS THAN 140/90: ICD-10-CM

## 2019-04-03 RX ORDER — AMLODIPINE BESYLATE 2.5 MG/1
TABLET ORAL
Qty: 90 TABLET | Refills: 0 | OUTPATIENT
Start: 2019-04-03

## 2019-04-03 RX ORDER — HYDROCHLOROTHIAZIDE 25 MG/1
TABLET ORAL
Qty: 90 TABLET | Refills: 0 | OUTPATIENT
Start: 2019-04-03

## 2019-07-02 DIAGNOSIS — E78.5 HYPERLIPIDEMIA LDL GOAL <130: ICD-10-CM

## 2019-07-02 NOTE — LETTER
July 3, 2019    Isaura WRIGHT [facility]  [address] [place] [address]    Dear Isaura,       We recently received a refill request for simvastatin (ZOCOR) 20 MG tablet.  We have refilled this for a one time 30 day supply only because you are due for a:    Hypertension and cholesterol office visit and fasting lab appointment      Please schedule this lab appointment 4-5 days prior to the office visit.     Please call at your earliest convenience so that there will not be a delay with your future refills.          Thank you,   Your [facility] Team/  [phone]

## 2019-07-03 RX ORDER — SIMVASTATIN 20 MG
TABLET ORAL
Qty: 30 TABLET | Refills: 0 | Status: SHIPPED | OUTPATIENT
Start: 2019-07-03 | End: 2019-08-01

## 2019-07-03 NOTE — TELEPHONE ENCOUNTER
Medication is being filled for 1 time refill only due to:  Patient needs to be seen because due for annual office visit and labs for Lipids and HTN.  Caroline Morales RN

## 2019-07-16 ENCOUNTER — TELEPHONE (OUTPATIENT)
Dept: FAMILY MEDICINE | Facility: CLINIC | Age: 54
End: 2019-07-16

## 2019-07-16 DIAGNOSIS — T50.2X5A DIURETIC-INDUCED HYPOKALEMIA: ICD-10-CM

## 2019-07-16 DIAGNOSIS — E87.6 DIURETIC-INDUCED HYPOKALEMIA: ICD-10-CM

## 2019-07-18 RX ORDER — POTASSIUM CHLORIDE 750 MG/1
TABLET, EXTENDED RELEASE ORAL
Qty: 30 TABLET | Refills: 0 | Status: SHIPPED | OUTPATIENT
Start: 2019-07-18 | End: 2019-08-06

## 2019-07-18 NOTE — TELEPHONE ENCOUNTER
Routing refill request to provider for review/approval because:  Mara given x1 and patient did not follow up, please advise  Patient needs to be seen because it has been more than 1 year since last office visit.      Marai Fernanda COCHRANN, RN, CPN

## 2019-07-18 NOTE — TELEPHONE ENCOUNTER
Please contact patient and schedule an appointment.   After the appointment is made, then give 30 days of medication.   Kristen Kehr PA-C

## 2019-07-24 ENCOUNTER — DOCUMENTATION ONLY (OUTPATIENT)
Dept: LAB | Facility: CLINIC | Age: 54
End: 2019-07-24

## 2019-07-24 DIAGNOSIS — I10 HYPERTENSION GOAL BP (BLOOD PRESSURE) < 140/90: Chronic | ICD-10-CM

## 2019-07-24 DIAGNOSIS — E78.5 HYPERLIPIDEMIA LDL GOAL <130: Primary | Chronic | ICD-10-CM

## 2019-07-24 NOTE — PROGRESS NOTES
Please review and sign Pending Pre-visit Labs in [facility]. Labs 07/30/19 and Physical BP/ Chol 08/06/19   Brianne CHO

## 2019-07-30 DIAGNOSIS — I10 HYPERTENSION GOAL BP (BLOOD PRESSURE) < 140/90: Chronic | ICD-10-CM

## 2019-07-30 DIAGNOSIS — I10 ESSENTIAL HYPERTENSION WITH GOAL BLOOD PRESSURE LESS THAN 140/90: ICD-10-CM

## 2019-07-30 DIAGNOSIS — E78.5 HYPERLIPIDEMIA LDL GOAL <130: Chronic | ICD-10-CM

## 2019-07-30 LAB
ANION GAP SERPL CALCULATED.3IONS-SCNC: 8 MMOL/L (ref 3–14)
BUN SERPL-MCNC: 15 MG/DL (ref 7–30)
CALCIUM SERPL-MCNC: 8.8 MG/DL (ref 8.5–10.1)
CHLORIDE SERPL-SCNC: 109 MMOL/L (ref 94–109)
CHOLEST SERPL-MCNC: 221 MG/DL
CO2 SERPL-SCNC: 24 MMOL/L (ref 20–32)
CREAT SERPL-MCNC: 0.72 MG/DL (ref 0.52–1.04)
GFR SERPL CREATININE-BSD FRML MDRD: >90 ML/MIN/{1.73_M2}
GLUCOSE SERPL-MCNC: 107 MG/DL (ref 70–99)
HDLC SERPL-MCNC: 52 MG/DL
LDLC SERPL CALC-MCNC: 140 MG/DL
NONHDLC SERPL-MCNC: 169 MG/DL
POTASSIUM SERPL-SCNC: 3.6 MMOL/L (ref 3.4–5.3)
SODIUM SERPL-SCNC: 141 MMOL/L (ref 133–144)
TRIGL SERPL-MCNC: 146 MG/DL

## 2019-07-30 PROCEDURE — 36415 COLL VENOUS BLD VENIPUNCTURE: CPT | Performed by: PHYSICIAN ASSISTANT

## 2019-07-30 PROCEDURE — 80048 BASIC METABOLIC PNL TOTAL CA: CPT | Performed by: PHYSICIAN ASSISTANT

## 2019-07-30 PROCEDURE — 80061 LIPID PANEL: CPT | Performed by: PHYSICIAN ASSISTANT

## 2019-07-30 RX ORDER — AMLODIPINE BESYLATE 2.5 MG/1
TABLET ORAL
Qty: 30 TABLET | Refills: 0 | Status: SHIPPED | OUTPATIENT
Start: 2019-07-30 | End: 2019-08-06

## 2019-07-30 NOTE — TELEPHONE ENCOUNTER
"Medication is being filled for 1 time refill only due to:  Patient needs to be seen because due for office visit. Scheduled on 8/6/19.   Prescription approved per [facility].    Requested Prescriptions   Pending Prescriptions Disp Refills     amLODIPine (NORVASC) 2.5 MG tablet [Pharmacy Med Name: AMLODIPINE BESYLATE 2.5MG TABLETS] 90 tablet 0     Sig: TAKE 1 TABLET(2.5 MG) BY MOUTH DAILY       Calcium Channel Blockers Protocol  Failed - 7/30/2019  9:53 AM        Failed - Blood pressure under 140/90 in past 12 months     BP Readings from Last 3 Encounters:   06/12/18 102/70   03/07/18 143/81   10/12/17 128/82           Failed - Normal serum creatinine on file in past 12 months     Recent Labs   Lab Test 07/30/19  0737   CR 0.72           Passed - Recent (12 mo) or future (30 days) visit within the authorizing provider's specialty     Patient had office visit in the last 12 months or has a visit in the next 30 days with authorizing provider or within the authorizing provider's specialty.  See \"Patient Info\" tab in inbasket, or \"Choose Columns\" in Meds & Orders section of the refill encounter.          Passed - Medication is active on med list        Passed - Patient is age 18 or older        Passed - No active pregnancy on record        Passed - No positive pregnancy test in past 12 months     HEATHER Yadav, RN    "

## 2019-08-01 DIAGNOSIS — E78.5 HYPERLIPIDEMIA LDL GOAL <130: ICD-10-CM

## 2019-08-01 RX ORDER — SIMVASTATIN 20 MG
TABLET ORAL
Qty: 30 TABLET | Refills: 0 | Status: SHIPPED | OUTPATIENT
Start: 2019-08-01 | End: 2019-08-06

## 2019-08-06 ENCOUNTER — OFFICE VISIT (OUTPATIENT)
Dept: FAMILY MEDICINE | Facility: CLINIC | Age: 54
End: 2019-08-06
Payer: COMMERCIAL

## 2019-08-06 VITALS
DIASTOLIC BLOOD PRESSURE: 83 MMHG | HEIGHT: 68 IN | HEART RATE: 60 BPM | BODY MASS INDEX: 37.59 KG/M2 | SYSTOLIC BLOOD PRESSURE: 126 MMHG | TEMPERATURE: 98.4 F | OXYGEN SATURATION: 95 % | WEIGHT: 248 LBS

## 2019-08-06 DIAGNOSIS — Z00.00 ROUTINE GENERAL MEDICAL EXAMINATION AT A HEALTH CARE FACILITY: Primary | ICD-10-CM

## 2019-08-06 DIAGNOSIS — T50.2X5A DIURETIC-INDUCED HYPOKALEMIA: ICD-10-CM

## 2019-08-06 DIAGNOSIS — I10 ESSENTIAL HYPERTENSION WITH GOAL BLOOD PRESSURE LESS THAN 140/90: ICD-10-CM

## 2019-08-06 DIAGNOSIS — Z12.31 VISIT FOR SCREENING MAMMOGRAM: ICD-10-CM

## 2019-08-06 DIAGNOSIS — E78.5 HYPERLIPIDEMIA LDL GOAL <130: ICD-10-CM

## 2019-08-06 DIAGNOSIS — Z12.4 SCREENING FOR MALIGNANT NEOPLASM OF CERVIX: ICD-10-CM

## 2019-08-06 DIAGNOSIS — E66.01 MORBID OBESITY (H): ICD-10-CM

## 2019-08-06 DIAGNOSIS — E87.6 DIURETIC-INDUCED HYPOKALEMIA: ICD-10-CM

## 2019-08-06 DIAGNOSIS — L73.2 HIDRADENITIS SUPPURATIVA: ICD-10-CM

## 2019-08-06 PROCEDURE — 87624 HPV HI-RISK TYP POOLED RSLT: CPT | Performed by: PHYSICIAN ASSISTANT

## 2019-08-06 PROCEDURE — 99396 PREV VISIT EST AGE 40-64: CPT | Performed by: PHYSICIAN ASSISTANT

## 2019-08-06 PROCEDURE — G0145 SCR C/V CYTO,THINLAYER,RESCR: HCPCS | Performed by: PHYSICIAN ASSISTANT

## 2019-08-06 PROCEDURE — 99213 OFFICE O/P EST LOW 20 MIN: CPT | Mod: 25 | Performed by: PHYSICIAN ASSISTANT

## 2019-08-06 RX ORDER — HYDROCHLOROTHIAZIDE 25 MG/1
25 TABLET ORAL DAILY
Qty: 90 TABLET | Refills: 1 | Status: SHIPPED | OUTPATIENT
Start: 2019-08-06 | End: 2020-01-29

## 2019-08-06 RX ORDER — SIMVASTATIN 20 MG
20 TABLET ORAL AT BEDTIME
Qty: 90 TABLET | Refills: 3 | Status: SHIPPED | OUTPATIENT
Start: 2019-08-06 | End: 2020-08-31

## 2019-08-06 RX ORDER — POTASSIUM CHLORIDE 750 MG/1
10 TABLET, EXTENDED RELEASE ORAL DAILY
Qty: 90 TABLET | Refills: 1 | Status: SHIPPED | OUTPATIENT
Start: 2019-08-06 | End: 2020-01-29

## 2019-08-06 RX ORDER — DOXYCYCLINE 100 MG/1
100 CAPSULE ORAL 2 TIMES DAILY
Qty: 20 CAPSULE | Refills: 0 | Status: SHIPPED | OUTPATIENT
Start: 2019-08-06 | End: 2019-10-04

## 2019-08-06 RX ORDER — AMLODIPINE BESYLATE 2.5 MG/1
2.5 TABLET ORAL DAILY
Qty: 90 TABLET | Refills: 1 | Status: SHIPPED | OUTPATIENT
Start: 2019-08-06 | End: 2020-03-09

## 2019-08-06 RX ORDER — CLINDAMYCIN PHOSPHATE 11.9 MG/ML
SOLUTION TOPICAL 2 TIMES DAILY
Qty: 60 ML | Refills: 3 | Status: SHIPPED | OUTPATIENT
Start: 2019-08-06 | End: 2020-01-07

## 2019-08-06 ASSESSMENT — ENCOUNTER SYMPTOMS
DIZZINESS: 0
MYALGIAS: 1
HEADACHES: 0
NAUSEA: 0
HEMATOCHEZIA: 0
CHILLS: 0
WEAKNESS: 0
NERVOUS/ANXIOUS: 0
COUGH: 0
JOINT SWELLING: 0
FEVER: 0
BREAST MASS: 0
HEARTBURN: 0
SHORTNESS OF BREATH: 0
PARESTHESIAS: 0
ARTHRALGIAS: 1
FREQUENCY: 0
SORE THROAT: 0
DIARRHEA: 0
EYE PAIN: 0
PALPITATIONS: 0
CONSTIPATION: 0
DYSURIA: 0
ABDOMINAL PAIN: 0
HEMATURIA: 0

## 2019-08-06 ASSESSMENT — PAIN SCALES - GENERAL: PAINLEVEL: NO PAIN (0)

## 2019-08-06 ASSESSMENT — MIFFLIN-ST. JEOR: SCORE: 1760.48

## 2019-08-06 NOTE — NURSING NOTE
"Chief Complaint   Patient presents with     Physical       Initial /83   Pulse 60   Temp 98.4  F (36.9  C) (Oral)   Ht 1.715 m (5' 7.5\")   Wt 112.5 kg (248 lb)   SpO2 95%   BMI 38.27 kg/m   Estimated body mass index is 38.27 kg/m  as calculated from the following:    Height as of this encounter: 1.715 m (5' 7.5\").    Weight as of this encounter: 112.5 kg (248 lb).  Medication Reconciliation: complete    RICHA Newton MA    "

## 2019-08-06 NOTE — PROGRESS NOTES
SUBJECTIVE:   CC: Isaura Mcmahan is an 54 year old woman who presents for preventive health visit.     Healthy Habits:     Getting at least 3 servings of Calcium per day:  Yes    Bi-annual eye exam:  Yes    Dental care twice a year:  Yes    Sleep apnea or symptoms of sleep apnea:  None    Diet:  Regular (no restrictions)    Frequency of exercise:  None    Taking medications regularly:  Yes    Medication side effects:  None    PHQ-2 Total Score: 0    Additional concerns today:  No    The 10-year ASCVD risk score (Raul BARNES Jr., et al., 2013) is: 3%    Values used to calculate the score:      Age: 54 years      Sex: Female      Is Non- : No      Diabetic: No      Tobacco smoker: No      Systolic Blood Pressure: 126 mmHg      Is BP treated: Yes      HDL Cholesterol: 52 mg/dL      Total Cholesterol: 221 mg/dL      Today's PHQ-2 Score:   PHQ-2 ( 1999 Pfizer) 8/6/2019   Q1: Little interest or pleasure in doing things 0   Q2: Feeling down, depressed or hopeless 0   PHQ-2 Score 0   Q1: Little interest or pleasure in doing things Not at all   Q2: Feeling down, depressed or hopeless Not at all   PHQ-2 Score 0       Abuse: Current or Past(Physical, Sexual or Emotional)- No  Do you feel safe in your environment? Yes    Social History     Tobacco Use     Smoking status: Never Smoker     Smokeless tobacco: Never Used     Tobacco comment: per chart   Substance Use Topics     Alcohol use: Yes     Comment: occassional     If you drink alcohol do you typically have >3 drinks per day or >7 drinks per week? No    Alcohol Use 8/6/2019   Prescreen: >3 drinks/day or >7 drinks/week? No   Prescreen: >3 drinks/day or >7 drinks/week? -   No flowsheet data found.    Reviewed orders with patient.  Reviewed health maintenance and updated orders accordingly - Yes  BP Readings from Last 3 Encounters:   08/06/19 126/83   06/12/18 102/70   03/07/18 143/81    Wt Readings from Last 3 Encounters:   08/06/19 112.5 kg (248 lb)    06/12/18 118.4 kg (261 lb)   03/07/18 125.6 kg (277 lb)                  Patient Active Problem List   Diagnosis     Hyperlipidemia LDL goal <130     Hypertension goal BP (blood pressure) < 140/90     Obesity     CARON (obstructive sleep apnea)- mild (AHI 10)     Obesity (BMI 35.0-39.9) with comorbidity (H)     Past Surgical History:   Procedure Laterality Date     COLONOSCOPY       COLONOSCOPY WITH CO2 INSUFFLATION N/A 5/19/2016    Procedure: COLONOSCOPY WITH CO2 INSUFFLATION;  Surgeon: Segundo Crane MD;  Location: [facility] OR     DRAIN PILONIDAL CYST SIMPL  04/2004    left wrist cyst removal     LAPAROSCOPIC TUBAL LIGATION         Social History     Tobacco Use     Smoking status: Never Smoker     Smokeless tobacco: Never Used     Tobacco comment: per chart   Substance Use Topics     Alcohol use: Yes     Comment: occassional     Family History   Problem Relation Age of Onset     Hypertension Mother      Hyperlipidemia Mother      Other Cancer Mother         Lung and Brain     Cancer Father         prostate     Prostate Cancer Father      Other Cancer Father         bone     Diabetes Maternal Grandmother      Cancer Brother         testicular     Other Cancer Brother         testicular/testicular     Hypertension Brother      Hyperlipidemia Brother      Prostate Cancer Brother          Current Outpatient Medications   Medication Sig Dispense Refill     amLODIPine (NORVASC) 2.5 MG tablet Take 1 tablet (2.5 mg) by mouth daily 90 tablet 1     clindamycin (CLEOCIN T) 1 % external solution Apply topically 2 times daily 60 mL 3     doxycycline hyclate (VIBRAMYCIN) 100 MG capsule Take 1 capsule (100 mg) by mouth 2 times daily 20 capsule 0     hydrochlorothiazide (HYDRODIURIL) 25 MG tablet Take 1 tablet (25 mg) by mouth daily 90 tablet 1     potassium chloride ER (K-TAB/KLOR-CON) 10 MEQ CR tablet Take 1 tablet (10 mEq) by mouth daily 90 tablet 1     simvastatin (ZOCOR) 20 MG tablet Take 1 tablet (20 mg) by mouth At  Bedtime 90 tablet 3     Allergies   Allergen Reactions     Amoxicillin Hives       Mammogram Screening: Patient over age 50, mutual decision to screen reflected in health maintenance.    Pertinent mammograms are reviewed under the imaging tab.  History of abnormal Pap smear: Status post benign hysterectomy. Health Maintenance and Surgical History updated.  PAP / HPV Latest Ref Rng & Units 11/10/2016 5/8/2013 3/4/2011   PAP - NIL NIL ASC-US(A)   HPV 16 DNA NEG Negative - -   HPV 18 DNA NEG Negative - -   OTHER HR HPV NEG Negative - -     Reviewed and updated as needed this visit by clinical staff  Tobacco  Allergies  Meds  Med Hx  Surg Hx  Fam Hx  Soc Hx        Reviewed and updated as needed this visit by Provider        Past Medical History:   Diagnosis Date     ASCUS on Pap smear     2011, neg HPV     Cancer (H)      Hypertension       Past Surgical History:   Procedure Laterality Date     COLONOSCOPY       COLONOSCOPY WITH CO2 INSUFFLATION N/A 5/19/2016    Procedure: COLONOSCOPY WITH CO2 INSUFFLATION;  Surgeon: Segundo Crane MD;  Location: [facility]     DRAIN PILONIDAL CYST SIMPL  04/2004    left wrist cyst removal     LAPAROSCOPIC TUBAL LIGATION         Review of Systems   Constitutional: Negative for chills and fever.   HENT: Negative for congestion, ear pain, hearing loss and sore throat.    Eyes: Negative for pain and visual disturbance.   Respiratory: Negative for cough and shortness of breath.    Cardiovascular: Negative for chest pain, palpitations and peripheral edema.   Gastrointestinal: Negative for abdominal pain, constipation, diarrhea, heartburn, hematochezia and nausea.   Breasts:  Negative for tenderness, breast mass and discharge.   Genitourinary: Negative for dysuria, frequency, genital sores, hematuria, pelvic pain, urgency, vaginal bleeding and vaginal discharge.   Musculoskeletal: Positive for arthralgias and myalgias. Negative for joint swelling.   Skin: Negative for rash.         "She has cysts in her medial thighs. They drain at times. They are painful. She has never been treated in the past, but she has had them off and on for years. She does not get them under her arms, only thighs.      Neurological: Negative for dizziness, weakness, headaches and paresthesias.   Psychiatric/Behavioral: Negative for mood changes. The patient is not nervous/anxious.      OBJECTIVE:   /83   Pulse 60   Temp 98.4  F (36.9  C) (Oral)   Ht 1.715 m (5' 7.5\")   Wt 112.5 kg (248 lb)   SpO2 95%   BMI 38.27 kg/m    Physical Exam  GENERAL APPEARANCE: healthy, alert and no distress  EYES: Eyes grossly normal to inspection, PERRL and conjunctivae and sclerae normal  HENT: ear canals and TM's normal, nose and mouth without ulcers or lesions, oropharynx clear and oral mucous membranes moist  NECK: no adenopathy, no asymmetry, masses, or scars and thyroid normal to palpation  RESP: lungs clear to auscultation - no rales, rhonchi or wheezes  BREAST: normal without masses, tenderness or nipple discharge and no palpable axillary masses or adenopathy  CV: regular rate and rhythm, normal S1 S2, no S3 or S4, no murmur, click or rub, no peripheral edema and peripheral pulses strong  ABDOMEN: soft, nontender, no hepatosplenomegaly, no masses and bowel sounds normal   (female): normal female external genitalia, normal urethral meatus, vaginal mucosal atrophy noted, normal cervix, adnexae, and uterus without masses or abnormal discharge  MS: no musculoskeletal defects are noted and gait is age appropriate without ataxia  SKIN: large inflamed red tender cyst in the medial left thigh. No drainage. Small cyst on the right.   NEURO: Normal strength and tone, sensory exam grossly normal, mentation intact and speech normal  PSYCH: mentation appears normal and affect normal/bright    Diagnostic Test Results:  Results for orders placed or performed in visit on 07/30/19   Lipid panel reflex to direct LDL Fasting   Result Value " Ref Range    Cholesterol 221 (H) <200 mg/dL    Triglycerides 146 <150 mg/dL    HDL Cholesterol 52 >49 mg/dL    LDL Cholesterol Calculated 140 (H) <100 mg/dL    Non HDL Cholesterol 169 (H) <130 mg/dL   **Basic metabolic panel FUTURE anytime   Result Value Ref Range    Sodium 141 133 - 144 mmol/L    Potassium 3.6 3.4 - 5.3 mmol/L    Chloride 109 94 - 109 mmol/L    Carbon Dioxide 24 20 - 32 mmol/L    Anion Gap 8 3 - 14 mmol/L    Glucose 107 (H) 70 - 99 mg/dL    Urea Nitrogen 15 7 - 30 mg/dL    Creatinine 0.72 0.52 - 1.04 mg/dL    GFR Estimate >90 >60 mL/min/[1.73_m2]    GFR Estimate If Black >90 >60 mL/min/[1.73_m2]    Calcium 8.8 8.5 - 10.1 mg/dL       ASSESSMENT/PLAN:   1. Routine general medical examination at a health care facility  Health maintenance reviewed and updated.  Recommend shingrix, however, there is no supply today. She will check insurance coverage.     2. Essential hypertension with goal blood pressure less than 140/90  Stable, refills given x 6 months.   Plan follow up for blood pressure check at that time.   - amLODIPine (NORVASC) 2.5 MG tablet; Take 1 tablet (2.5 mg) by mouth daily  Dispense: 90 tablet; Refill: 1  - hydrochlorothiazide (HYDRODIURIL) 25 MG tablet; Take 1 tablet (25 mg) by mouth daily  Dispense: 90 tablet; Refill: 1    3. Diuretic-induced hypokalemia  Stable, refills given x 6 months.   - potassium chloride ER (K-TAB/KLOR-CON) 10 MEQ CR tablet; Take 1 tablet (10 mEq) by mouth daily  Dispense: 90 tablet; Refill: 1    4. Hyperlipidemia LDL goal <130  Stable, refills given x 6 months.   Encouraged lifestyle changes also.   - simvastatin (ZOCOR) 20 MG tablet; Take 1 tablet (20 mg) by mouth At Bedtime  Dispense: 90 tablet; Refill: 3    5. Hidradenitis suppurativa  Treat with doxycycline for her current inflamed cyst on the left thigh. Side effects and how to take the medication discussed.  Clindamycin topically to start also for prevention.   If she is still having difficulty, she will  "send a [employer] message, referral to Dermatology will be needed.   - doxycycline hyclate (VIBRAMYCIN) 100 MG capsule; Take 1 capsule (100 mg) by mouth 2 times daily  Dispense: 20 capsule; Refill: 0  - clindamycin (CLEOCIN T) 1 % external solution; Apply topically 2 times daily  Dispense: 60 mL; Refill: 3    6. Screening for malignant neoplasm of cervix  - Pap imaged thin layer screen with HPV - recommended age 30 - 65  - HPV High Risk Types DNA Cervical    7. Visit for screening mammogram  - MA SCREENING DIGITAL BILAT - Future  (s+30); Future    8. Morbid obesity (H)  She has had some success with Weight Watcher's encouraged to continue along with adding exercise.       COUNSELING:  Reviewed preventive health counseling, as reflected in patient instructions       Regular exercise       Healthy diet/nutrition       Colon cancer screening    Estimated body mass index is 38.27 kg/m  as calculated from the following:    Height as of this encounter: 1.715 m (5' 7.5\").    Weight as of this encounter: 112.5 kg (248 lb).    Weight management plan: Discussed healthy diet and exercise guidelines     reports that she has never smoked. She has never used smokeless tobacco.      Counseling Resources:  ATP IV Guidelines  Pooled Cohorts Equation Calculator  Breast Cancer Risk Calculator  FRAX Risk Assessment  ICSI Preventive Guidelines  Dietary Guidelines for Americans, 2010  USDA's MyPlate  ASA Prophylaxis  Lung CA Screening    Kristen M. Kehr, PA-C  [facility]  "

## 2019-08-08 LAB
COPATH REPORT: NORMAL
PAP: NORMAL

## 2019-08-12 LAB
FINAL DIAGNOSIS: NORMAL
HPV HR 12 DNA CVX QL NAA+PROBE: NEGATIVE
HPV16 DNA SPEC QL NAA+PROBE: NEGATIVE
HPV18 DNA SPEC QL NAA+PROBE: NEGATIVE
SPECIMEN DESCRIPTION: NORMAL
SPECIMEN SOURCE CVX/VAG CYTO: NORMAL

## 2019-09-20 ENCOUNTER — ANCILLARY PROCEDURE (OUTPATIENT)
Dept: MAMMOGRAPHY | Facility: CLINIC | Age: 54
End: 2019-09-20
Attending: PHYSICIAN ASSISTANT
Payer: COMMERCIAL

## 2019-09-20 DIAGNOSIS — Z12.31 VISIT FOR SCREENING MAMMOGRAM: ICD-10-CM

## 2019-09-20 PROCEDURE — 77063 BREAST TOMOSYNTHESIS BI: CPT | Mod: TC

## 2019-09-20 PROCEDURE — 77067 SCR MAMMO BI INCL CAD: CPT | Mod: TC

## 2019-10-03 ENCOUNTER — MYC MEDICAL ADVICE (OUTPATIENT)
Dept: FAMILY MEDICINE | Facility: CLINIC | Age: 54
End: 2019-10-03

## 2019-10-03 DIAGNOSIS — L73.2 HIDRADENITIS SUPPURATIVA: ICD-10-CM

## 2019-10-04 RX ORDER — DOXYCYCLINE 100 MG/1
100 CAPSULE ORAL 2 TIMES DAILY
Qty: 20 CAPSULE | Refills: 0 | Status: SHIPPED | OUTPATIENT
Start: 2019-10-04 | End: 2020-01-08

## 2019-10-04 NOTE — TELEPHONE ENCOUNTER
Per 8/6 office visit:  5. Hidradenitis suppurativa  Treat with doxycycline for her current inflamed cyst on the left thigh. Side effects and how to take the medication discussed.  Clindamycin topically to start also for prevention.   If she is still having difficulty, she will send a [employer] message, referral to Dermatology will be needed.   - doxycycline hyclate (VIBRAMYCIN) 100 MG capsule; Take 1 capsule (100 mg) by mouth 2 times daily  Dispense: 20 capsule; Refill: 0  - clindamycin (CLEOCIN T) 1 % external solution; Apply topically 2 times daily  Dispense: 60 mL; Refill: 3         To provider to advise    Maria Fernanda COCHRANN, RN, CPN

## 2019-11-09 ENCOUNTER — HEALTH MAINTENANCE LETTER (OUTPATIENT)
Age: 54
End: 2019-11-09

## 2020-01-06 ENCOUNTER — MYC MEDICAL ADVICE (OUTPATIENT)
Dept: FAMILY MEDICINE | Facility: CLINIC | Age: 55
End: 2020-01-06

## 2020-01-06 DIAGNOSIS — L73.2 HIDRADENITIS SUPPURATIVA: ICD-10-CM

## 2020-01-07 NOTE — TELEPHONE ENCOUNTER
For provider to advise on mychart message regarding the cysts between her legs.  OV 8/6/19 was given doxycycline oral and clindamycin topical for it.  10/3/19 refill given and referral to dermatology.  Per yesterday's mychart message derm appointment now changed from January to February.  (had scheduled it in October) same spot became inflamed again, broke open and draining.  See mychart message.  Angely Nunes RN

## 2020-01-08 RX ORDER — CLINDAMYCIN PHOSPHATE 11.9 MG/ML
SOLUTION TOPICAL 2 TIMES DAILY
Qty: 60 ML | Refills: 0 | Status: SHIPPED | OUTPATIENT
Start: 2020-01-08 | End: 2020-02-06

## 2020-01-08 RX ORDER — DOXYCYCLINE 100 MG/1
100 CAPSULE ORAL 2 TIMES DAILY
Qty: 20 CAPSULE | Refills: 0 | Status: SHIPPED | OUTPATIENT
Start: 2020-01-08 | End: 2020-02-06

## 2020-01-08 NOTE — TELEPHONE ENCOUNTER
Pharmacy pended. Drug not on refill protocol. To provider to review and sign pending orders.  SAMMIE YadavN, RN

## 2020-01-28 DIAGNOSIS — I10 ESSENTIAL HYPERTENSION WITH GOAL BLOOD PRESSURE LESS THAN 140/90: ICD-10-CM

## 2020-01-28 DIAGNOSIS — E87.6 DIURETIC-INDUCED HYPOKALEMIA: ICD-10-CM

## 2020-01-28 DIAGNOSIS — T50.2X5A DIURETIC-INDUCED HYPOKALEMIA: ICD-10-CM

## 2020-01-28 NOTE — LETTER
January 30, 2020    Isaura WRIGHT [facility]  [address] [place] [address]    Dear Isaura,       We recently received a refill request for hydrochlorothiazide and potassium chloride.  We have refilled this for a one time 90 day supply only because you are due for a:    Blood pressure/medication check office visit and fasting lab appointment-appointment due in February.      Please schedule this lab appointment 4-5 days prior to the office visit.     Please call at your earliest convenience so that there will not be a delay with your future refills.          Thank you,   Your [facility] Team/  [phone]

## 2020-01-29 RX ORDER — POTASSIUM CHLORIDE 750 MG/1
TABLET, EXTENDED RELEASE ORAL
Qty: 90 TABLET | Refills: 0 | Status: SHIPPED | OUTPATIENT
Start: 2020-01-29 | End: 2020-04-23

## 2020-01-29 RX ORDER — HYDROCHLOROTHIAZIDE 25 MG/1
TABLET ORAL
Qty: 90 TABLET | Refills: 0 | Status: SHIPPED | OUTPATIENT
Start: 2020-01-29 | End: 2020-04-23

## 2020-01-29 NOTE — TELEPHONE ENCOUNTER
Due for 6 month office visit for BP and labs in February. Please send patient letter. Caroline Morales RN

## 2020-02-06 ENCOUNTER — OFFICE VISIT (OUTPATIENT)
Dept: DERMATOLOGY | Facility: CLINIC | Age: 55
End: 2020-02-06
Attending: PHYSICIAN ASSISTANT
Payer: COMMERCIAL

## 2020-02-06 DIAGNOSIS — L73.2 HIDRADENITIS SUPPURATIVA: Primary | ICD-10-CM

## 2020-02-06 PROCEDURE — 99202 OFFICE O/P NEW SF 15 MIN: CPT | Performed by: DERMATOLOGY

## 2020-02-06 RX ORDER — CLINDAMYCIN PHOSPHATE 10 UG/ML
LOTION TOPICAL
Qty: 60 ML | Refills: 11 | Status: SHIPPED | OUTPATIENT
Start: 2020-02-06 | End: 2021-07-06

## 2020-02-06 RX ORDER — DOXYCYCLINE 100 MG/1
100 CAPSULE ORAL 2 TIMES DAILY
Qty: 180 CAPSULE | Refills: 0 | Status: SHIPPED | OUTPATIENT
Start: 2020-02-06 | End: 2020-05-28

## 2020-02-06 ASSESSMENT — PAIN SCALES - GENERAL: PAINLEVEL: MILD PAIN (2)

## 2020-02-06 NOTE — PATIENT INSTRUCTIONS
Hidradenitis Suppurativa:    In the shower, begin using an over the counter benzoyl peroxide face wash as a body wash on affected skin. One I love is called Neutrogena Clear Pore. Picture below. Also apply Clindamycin 1% lotion topically once a day on any bumps you are noticing after showering.     Begin taking doxycycline 100 mg twice a day, everyday for 3 months. Further information on this antibiotic shown below.     You can also start using bleach baths several times per week. You can make one of these baths by putting 1/2 cup non-concentrated bleach (1/3 cup of concentrated bleach) in a full tub of water. Soak for 10-15 minutes. As soon as you get out of the bath, apply moisturizer. I recommend a cream based moisturizer Vanicream.        Doxycycline     1.Doxycycline treats and prevents infections and may be used to treat acne.   2.Do not use it if you had an allergic reaction to doxycycline or another tetracycline antibiotic, or if you are pregnant or breastfeeding.  3. If you miss a dose, take a dose as soon as you remember. If it is almost time for your next dose, wait until then and take a regular dose. Do not take extra medicine to make up for a missed dose.   4 . Some foods and medicines can affect the medication. Tell your doctor if you are using any of the following: bismuth subsalicylate, isotretinoin, acitretin, medications that contain aluminum, calcium, or iron (such an antacid or vitamin supplement)  5. This medicine may cause birth defects if being used during pregnancy.  Use two forms of birth control to keep from getting pregnant.   6. Tell your doctor if you had stomach surgery or if you have a history of yeast infections.   7. This medicine may cause the following problems (stop the medication if you experience these symptoms and call your doctor):  Permanent change in tooth color (in children younger than 8 years old)   Increased pressure inside the head   Yeast infection   Diarrhea    This  medicine may make your skin more sun sensitive and increase sun burns. Wear sunscreen and do not tan.     Allergic reaction with itching, hives, facial swelling, throat swelling, chest tightness, trouble breathing     Blistering, peeling, red skin rash     Burning, pain, or irritation in your upper stomach or throat     Joint pain, fever, rash, and unusual tiredness or weakness     Severe headache, dizziness, or vision changes  8. Call your doctor if your symptoms worsen or do not improve  Who should I call with questions?    [facility]: [phone]     [facility]: [phone]    For urgent needs outside of business hours call the [facility] at [phone] and ask for the dermatology resident on call

## 2020-02-06 NOTE — LETTER
"    2/6/2020         RE: Isaura Mcmahan  [address] [place] [address]        Dear Colleague,    Thank you for referring your patient, Isaura Mcmahan, to the [facility]. Please see a copy of my visit note below.    [facility] Dermatology Note    Dermatology Problem List:  1. Hidradenitis suppurativa, Quiñonez stage 1  - Current t/x: Doxycycline 100 mg BID x3 months, clindamycin 1% daily, BPO wash, bleach baths    Encounter Date: Feb 6, 2020    CC:  Chief Complaint   Patient presents with     Derm Problem     HS - going on for years. Only inner thighs and groin. Not using anything currently. No personal or family hx SC.      History of Present Illness:  Ms. Isaura Mcmahan is a 54 year old female who presents as a referral from Kristen M Kehr, PA-C for hidradenitis suppurativa. Today she reports recurrent pimple like bumps on her inner thighs and groin. This has been occurring since her high school years. Her inframammary skin, buttocks, and axilla are never affected. She is currently experiencing a flare on the left thigh. A boil just opened up and drained, so now it is feeling better.     She has never been received a diagnosis for this issue previously, she thought she would \"always just have to deal with it\". She was previously treating for HS by her PCP with short courses oral doxycycline 100 mg BID and short course clindamycin 1% solution. She has never been on long term meds for this. She currently uses Olay body wash in the shower.     No other concerns addressed today.    Past Medical History:   Patient Active Problem List   Diagnosis     Hyperlipidemia LDL goal <130     Hypertension goal BP (blood pressure) < 140/90     Obesity     CARON (obstructive sleep apnea)- mild (AHI 10)     Obesity (BMI 35.0-39.9) with comorbidity (H)     Past Medical History:   Diagnosis Date     ASCUS on Pap smear     2011, neg HPV     Cancer (H)      Hypertension      Past Surgical " History:   Procedure Laterality Date     COLONOSCOPY       COLONOSCOPY WITH CO2 INSUFFLATION N/A 5/19/2016    Procedure: COLONOSCOPY WITH CO2 INSUFFLATION;  Surgeon: Segundo Crane MD;  Location: [facility] OR     DRAIN PILONIDAL CYST SIMPL  04/2004    left wrist cyst removal     LAPAROSCOPIC TUBAL LIGATION         Social History:  Patient works as an .     Family History:  Not assessed today.     Medications:  Current Outpatient Medications   Medication Sig Dispense Refill     amLODIPine (NORVASC) 2.5 MG tablet Take 1 tablet (2.5 mg) by mouth daily 90 tablet 1     clindamycin (CLEOCIN T) 1 % external lotion Apply thin layer once a day to affected areas. 60 mL 11     doxycycline monohydrate (MONODOX) 100 MG capsule Take 1 capsule (100 mg) by mouth 2 times daily 180 capsule 0     hydrochlorothiazide (HYDRODIURIL) 25 MG tablet TAKE 1 TABLET(25 MG) BY MOUTH DAILY 90 tablet 0     potassium chloride ER (K-TAB/KLOR-CON) 10 MEQ CR tablet TAKE 1 TABLET(10 MEQ) BY MOUTH DAILY 90 tablet 0     simvastatin (ZOCOR) 20 MG tablet Take 1 tablet (20 mg) by mouth At Bedtime 90 tablet 3     clindamycin (CLEOCIN T) 1 % external solution Apply topically 2 times daily 60 mL 0     doxycycline hyclate (VIBRAMYCIN) 100 MG capsule Take 1 capsule (100 mg) by mouth 2 times daily 20 capsule 0       Allergies   Allergen Reactions     Amoxicillin Hives     Review of Systems:  -Constitutional: Patient is otherwise feeling well, in usual state of health.   -Skin: As above in HPI. No additional skin concerns.    Physical exam:  Vitals: There were no vitals taken for this visit.  GEN: This is a well developed, well-nourished female in no acute distress, in a pleasant mood.    SKIN: Focused examination of the head/face, neck, hands, groin, bilateral lower extremities was performed.  - Alcantar Type II  - Minor folliculitis in bilateral thighs. About 7 pink papules with while follicular heads  - 2 cm slightly warm but not fluctuant nodule  on left medial thigh  - Some PIH scattered on medial thighs.  - No sinus tracts or deep scarring.  - No other lesions of concern on areas examined.     Impression/Plan:    1. Hidradenitis suppurativa, mild or Quiñonez stage 1. Discussed the relatively unknown pathogenesis of this condition. This is a chronic condition that doesn't have a cure but can respond well to treatment. First line treatment is with an otc BPO face wash as a body wash, topical antibiotics, and dilute bleach soaks. Second line is a 3 month course of an oral antibiotic. The goal would be to taper off oral antibiotics and maintain on topicals after 3 months, but this is not always possible. If unable to taper off completely, would try to go to lowest effective dose possible.  Provided patient with the following instructions:   - In the shower, begin using an over the counter benzoyl peroxide face wash as a body wash on affected skin. One I love is called Neutrogena Clear Pore. Once a week, do a dilute bleach bath (instructions below). Apply Clindamycin 1% lotion topically on any bumps you are noticing after showering/bathing. Also begin taking doxycycline 100 mg twice a day, everyday for 3 months.  - Advised patient to begin taking bleach baths as follows: You can also start using bleach baths several times per week. You can make one of these baths by putting 1/2 cup non-concentrated bleach (1/3 cup of concentrated bleach) in a full tub of water. Soak for 10-15 minutes. As soon as you get out of the bath, apply moisturizer. I recommend a cream based moisturizer Vanicream.  - Prescribed doxycycline 100 BID x3 months to be taken with food and a glass of water. No dairy products. Reviewed side effects in detail. Handout provided.     CC Kristen M Kehr, PA-C on close of this encounter.  Follow-up in 3 months for HS, earlier for new or changing lesions.     Staff Involved:  Scribe/Staff    Scribe Disclosure  I, Amalia White, am serving as a scribe to  document services personally performed by Dr. Zhanna Lozano MD, based on data collection and the provider's statements to me.     Provider Disclosure:   The documentation recorded by the scribe accurately reflects the services I personally performed and the decisions made by me.    Zhanna Lozano MD    Department of Dermatology  [facility]: Phone: [phone], Fax:[phone]  [facility]: Phone: [phone], Fax: [phone]                Again, thank you for allowing me to participate in the care of your patient.        Sincerely,        Zhanna Lozano MD

## 2020-02-06 NOTE — PROGRESS NOTES
"[facility] Dermatology Note    Dermatology Problem List:  1. Hidradenitis suppurativa, Khang stage 1  - Current t/x: Doxycycline 100 mg BID x3 months, clindamycin 1% daily, BPO wash, bleach baths    Encounter Date: Feb 6, 2020    CC:  Chief Complaint   Patient presents with     Derm Problem     HS - going on for years. Only inner thighs and groin. Not using anything currently. No personal or family hx SC.      History of Present Illness:  Ms. Isaura Mcmahan is a 54 year old female who presents as a referral from Kristen M Kehr, PA-C for hidradenitis suppurativa. Today she reports recurrent pimple like bumps on her inner thighs and groin. This has been occurring since her high school years. Her inframammary skin, buttocks, and axilla are never affected. She is currently experiencing a flare on the left thigh. A boil just opened up and drained, so now it is feeling better.     She has never been received a diagnosis for this issue previously, she thought she would \"always just have to deal with it\". She was previously treating for HS by her PCP with short courses oral doxycycline 100 mg BID and short course clindamycin 1% solution. She has never been on long term meds for this. She currently uses Olay body wash in the shower.     No other concerns addressed today.    Past Medical History:   Patient Active Problem List   Diagnosis     Hyperlipidemia LDL goal <130     Hypertension goal BP (blood pressure) < 140/90     Obesity     CARON (obstructive sleep apnea)- mild (AHI 10)     Obesity (BMI 35.0-39.9) with comorbidity (H)     Past Medical History:   Diagnosis Date     ASCUS on Pap smear     2011, neg HPV     Cancer (H)      Hypertension      Past Surgical History:   Procedure Laterality Date     COLONOSCOPY       COLONOSCOPY WITH CO2 INSUFFLATION N/A 5/19/2016    Procedure: COLONOSCOPY WITH CO2 INSUFFLATION;  Surgeon: Segundo Crane MD;  Location: [facility] OR     DRAIN PILONIDAL CYST SIMPL  " 04/2004    left wrist cyst removal     LAPAROSCOPIC TUBAL LIGATION         Social History:  Patient works as an .     Family History:  Not assessed today.     Medications:  Current Outpatient Medications   Medication Sig Dispense Refill     amLODIPine (NORVASC) 2.5 MG tablet Take 1 tablet (2.5 mg) by mouth daily 90 tablet 1     clindamycin (CLEOCIN T) 1 % external lotion Apply thin layer once a day to affected areas. 60 mL 11     doxycycline monohydrate (MONODOX) 100 MG capsule Take 1 capsule (100 mg) by mouth 2 times daily 180 capsule 0     hydrochlorothiazide (HYDRODIURIL) 25 MG tablet TAKE 1 TABLET(25 MG) BY MOUTH DAILY 90 tablet 0     potassium chloride ER (K-TAB/KLOR-CON) 10 MEQ CR tablet TAKE 1 TABLET(10 MEQ) BY MOUTH DAILY 90 tablet 0     simvastatin (ZOCOR) 20 MG tablet Take 1 tablet (20 mg) by mouth At Bedtime 90 tablet 3     clindamycin (CLEOCIN T) 1 % external solution Apply topically 2 times daily 60 mL 0     doxycycline hyclate (VIBRAMYCIN) 100 MG capsule Take 1 capsule (100 mg) by mouth 2 times daily 20 capsule 0       Allergies   Allergen Reactions     Amoxicillin Hives     Review of Systems:  -Constitutional: Patient is otherwise feeling well, in usual state of health.   -Skin: As above in HPI. No additional skin concerns.    Physical exam:  Vitals: There were no vitals taken for this visit.  GEN: This is a well developed, well-nourished female in no acute distress, in a pleasant mood.    SKIN: Focused examination of the head/face, neck, hands, groin, bilateral lower extremities was performed.  - Alcantar Type II  - Minor folliculitis in bilateral thighs. About 7 pink papules with while follicular heads  - 2 cm slightly warm but not fluctuant nodule on left medial thigh  - Some PIH scattered on medial thighs.  - No sinus tracts or deep scarring.  - No other lesions of concern on areas examined.     Impression/Plan:    1. Hidradenitis suppurativa, mild or Quiñonez stage 1. Discussed the  relatively unknown pathogenesis of this condition. This is a chronic condition that doesn't have a cure but can respond well to treatment. First line treatment is with an otc BPO face wash as a body wash, topical antibiotics, and dilute bleach soaks. Second line is a 3 month course of an oral antibiotic. The goal would be to taper off oral antibiotics and maintain on topicals after 3 months, but this is not always possible. If unable to taper off completely, would try to go to lowest effective dose possible.  Provided patient with the following instructions:   - In the shower, begin using an over the counter benzoyl peroxide face wash as a body wash on affected skin. One I love is called Neutrogena Clear Pore. Once a week, do a dilute bleach bath (instructions below). Apply Clindamycin 1% lotion topically on any bumps you are noticing after showering/bathing. Also begin taking doxycycline 100 mg twice a day, everyday for 3 months.  - Advised patient to begin taking bleach baths as follows: You can also start using bleach baths several times per week. You can make one of these baths by putting 1/2 cup non-concentrated bleach (1/3 cup of concentrated bleach) in a full tub of water. Soak for 10-15 minutes. As soon as you get out of the bath, apply moisturizer. I recommend a cream based moisturizer Vanicream.  - Prescribed doxycycline 100 BID x3 months to be taken with food and a glass of water. No dairy products. Reviewed side effects in detail. Handout provided.     CC Kristen M Kehr, PA-C on close of this encounter.  Follow-up in 3 months for HS, earlier for new or changing lesions.     Staff Involved:  Scribe/Staff    Scribe Disclosure  I, Amalia White, am serving as a scribe to document services personally performed by Dr. Zhanna Lozano MD, based on data collection and the provider's statements to me.     Provider Disclosure:   The documentation recorded by the scribe accurately reflects the services I  personally performed and the decisions made by me.    Zhanna Lozano MD    Department of Dermatology  [facility]: Phone: [phone], Fax:[phone]  [facility]: Phone: [phone], Fax: [phone]

## 2020-02-06 NOTE — NURSING NOTE
Isaura Mcmahan's goals for this visit include:   Chief Complaint   Patient presents with     Derm Problem     HS - going on for years. Only inner thighs and groin. Not using anything currently. No personal or family hx SC.        She requests these members of her care team be copied on today's visit information: Yes    PCP: Kehr, Kristen M    Referring Provider:  Kristen M Kehr, PA-C  [address] [place] [address]    There were no vitals taken for this visit.    Do you need any medication refills at today's visit? No  Swetha Brian LPN

## 2020-03-06 DIAGNOSIS — I10 ESSENTIAL HYPERTENSION WITH GOAL BLOOD PRESSURE LESS THAN 140/90: ICD-10-CM

## 2020-03-09 RX ORDER — AMLODIPINE BESYLATE 2.5 MG/1
TABLET ORAL
Qty: 90 TABLET | Refills: 0 | Status: SHIPPED | OUTPATIENT
Start: 2020-03-09 | End: 2020-06-05

## 2020-04-08 ENCOUNTER — TELEPHONE (OUTPATIENT)
Dept: DERMATOLOGY | Facility: CLINIC | Age: 55
End: 2020-04-08

## 2020-04-23 DIAGNOSIS — T50.2X5A DIURETIC-INDUCED HYPOKALEMIA: ICD-10-CM

## 2020-04-23 DIAGNOSIS — I10 ESSENTIAL HYPERTENSION WITH GOAL BLOOD PRESSURE LESS THAN 140/90: ICD-10-CM

## 2020-04-23 DIAGNOSIS — E87.6 DIURETIC-INDUCED HYPOKALEMIA: ICD-10-CM

## 2020-04-23 RX ORDER — HYDROCHLOROTHIAZIDE 25 MG/1
TABLET ORAL
Qty: 90 TABLET | Refills: 0 | Status: SHIPPED | OUTPATIENT
Start: 2020-04-23 | End: 2020-07-28

## 2020-04-23 RX ORDER — POTASSIUM CHLORIDE 750 MG/1
TABLET, EXTENDED RELEASE ORAL
Qty: 90 TABLET | Refills: 0 | Status: SHIPPED | OUTPATIENT
Start: 2020-04-23 | End: 2020-07-28

## 2020-05-26 ENCOUNTER — MYC MEDICAL ADVICE (OUTPATIENT)
Dept: FAMILY MEDICINE | Facility: CLINIC | Age: 55
End: 2020-05-26

## 2020-05-26 NOTE — TELEPHONE ENCOUNTER
Would you like to see patient tomorrow?  You still have a couple openings.     Hanh COCHRANN, RN

## 2020-05-26 NOTE — TELEPHONE ENCOUNTER
Please help her schedule an appointment. I am seeing patients in the clinic next week also if there are no longer openings this week.   Kristen Kehr PA-C

## 2020-05-27 NOTE — PROGRESS NOTES
Subjective     Isaura Mcmahan is a 54 year old female who presents to clinic today for the following health issues:    HPI   Dizziness / Vertigo      Duration: 6 weeks     Description   Feeling faint:  no   Feeling like the surroundings are moving: YES  Episodic. She has been taking allergy medication and it helps sometimes, but she does not have a significant amount of nasal congestion. She notices the vertigo is worse if her head is turned to the right. It is also worse if she is lying down. After she is up and moving around, it will get better and often go away.   Loss of consciousness or falls: no     Intensity:  moderate    Accompanying signs and symptoms:   Nausea/vomitting: YES  Palpitations: no   Weakness in arms or legs: no   Vision or speech changes: YES- little blurry  Ringing in ears (Tinnitus): no   Hearing loss related to dizziness: no   Other (fevers/chills/sweating/dyspnea): no     History (similar episodes/head trauma/previous evaluation/recent bleeding): None    Precipitating or alleviating factors (new meds/chemicals): None  Worse with activity/head movement: YES worse on the right    Therapies tried and outcome: allegra helped some      Patient Active Problem List   Diagnosis     Hyperlipidemia LDL goal <130     Hypertension goal BP (blood pressure) < 140/90     Obesity     CARON (obstructive sleep apnea)- mild (AHI 10)     Obesity (BMI 35.0-39.9) with comorbidity (H)     Past Surgical History:   Procedure Laterality Date     COLONOSCOPY       COLONOSCOPY WITH CO2 INSUFFLATION N/A 5/19/2016    Procedure: COLONOSCOPY WITH CO2 INSUFFLATION;  Surgeon: Segundo Crane MD;  Location: [facility] OR     DRAIN PILONIDAL CYST SIMPL  04/2004    left wrist cyst removal     LAPAROSCOPIC TUBAL LIGATION         Social History     Tobacco Use     Smoking status: Never Smoker     Smokeless tobacco: Never Used     Tobacco comment: per chart   Substance Use Topics     Alcohol use: Yes     Comment: occassional      "Family History   Problem Relation Age of Onset     Hypertension Mother      Hyperlipidemia Mother      Other Cancer Mother         Lung and Brain     Cancer Father         prostate     Prostate Cancer Father      Other Cancer Father         bone     Diabetes Maternal Grandmother      Cancer Brother         testicular     Other Cancer Brother         testicular/testicular     Hypertension Brother      Hyperlipidemia Brother      Prostate Cancer Brother          Current Outpatient Medications   Medication Sig Dispense Refill     amLODIPine (NORVASC) 2.5 MG tablet TAKE 1 TABLET(2.5 MG) BY MOUTH DAILY 90 tablet 0     clindamycin (CLEOCIN T) 1 % external lotion Apply thin layer once a day to affected areas. 60 mL 11     hydrochlorothiazide (HYDRODIURIL) 25 MG tablet TAKE 1 TABLET(25 MG) BY MOUTH DAILY 90 tablet 0     meclizine (ANTIVERT) 25 MG tablet Take 1 tablet (25 mg) by mouth 3 times daily as needed for dizziness 30 tablet 1     potassium chloride ER (K-TAB/KLOR-CON) 10 MEQ CR tablet TAKE 1 TABLET(10 MEQ) BY MOUTH DAILY 90 tablet 0     simvastatin (ZOCOR) 20 MG tablet Take 1 tablet (20 mg) by mouth At Bedtime 90 tablet 3     Allergies   Allergen Reactions     Amoxicillin Hives     BP Readings from Last 3 Encounters:   05/28/20 (!) 147/88   08/06/19 126/83   06/12/18 102/70    Wt Readings from Last 3 Encounters:   05/28/20 117.5 kg (259 lb)   08/06/19 112.5 kg (248 lb)   06/12/18 118.4 kg (261 lb)                      Reviewed and updated as needed this visit by Provider  Tobacco  Allergies  Meds  Problems  Med Hx  Surg Hx  Fam Hx         Review of Systems   Constitutional, HEENT, cardiovascular, pulmonary, GI, , musculoskeletal, neuro, skin, endocrine and psych systems are negative, except as otherwise noted.      Objective    BP (!) 147/88   Pulse 88   Temp 98.3  F (36.8  C) (Oral)   Ht 1.715 m (5' 7.5\")   Wt 117.5 kg (259 lb)   BMI 39.97 kg/m    Body mass index is 39.97 kg/m .  Physical Exam "   GENERAL: healthy, alert and no distress  EYES: Eyes grossly normal to inspection, PERRL and conjunctivae and sclerae normal  HENT: ear canals and TM's normal, nose and mouth without ulcers or lesions  NECK: no adenopathy, no asymmetry, masses, or scars and thyroid normal to palpation  RESP: lungs clear to auscultation - no rales, rhonchi or wheezes  CV: regular rate and rhythm, normal S1 S2, no S3 or S4, no murmur, click or rub, no peripheral edema and peripheral pulses strong  MS: no gross musculoskeletal defects noted, no edema  SKIN: no suspicious lesions or rashes  NEURO: Normal strength and tone, sensory exam grossly normal, mentation intact, cranial nerves 2-12 intact. Hallpike maneuver done in the office and vertigo reproduced and lateralized to the right.   PSYCH: mentation appears normal, affect normal/bright    Diagnostic Test Results:  Labs reviewed in Epic        Assessment & Plan     1. Benign paroxysmal positional vertigo of right ear  Trial of meclizine as needed. Side effects and how to take the medication discussed.  Plan to start physical therapy as soon as she is able with the COVID-19 restrictions. She will call to get an appointment.   - meclizine (ANTIVERT) 25 MG tablet; Take 1 tablet (25 mg) by mouth 3 times daily as needed for dizziness  Dispense: 30 tablet; Refill: 1  - [facility]; Future     2. Hypertension  Repeat blood pressure today, it is still elevated.   I was unable to catch her prior to her leaving after her tetanus shot.   A phone message was sent to have her repeat in 2 weeks at the drive up service and I will adjust meds if needed.    Return in about 2 weeks (around 6/11/2020) for specialty / make an appointment [facility] physicl therapy .    Kristen M. Kehr, PA-C  [facility]

## 2020-05-28 ENCOUNTER — OFFICE VISIT (OUTPATIENT)
Dept: FAMILY MEDICINE | Facility: CLINIC | Age: 55
End: 2020-05-28
Payer: COMMERCIAL

## 2020-05-28 ENCOUNTER — TELEPHONE (OUTPATIENT)
Dept: FAMILY MEDICINE | Facility: CLINIC | Age: 55
End: 2020-05-28

## 2020-05-28 VITALS
DIASTOLIC BLOOD PRESSURE: 88 MMHG | SYSTOLIC BLOOD PRESSURE: 147 MMHG | HEART RATE: 88 BPM | HEIGHT: 68 IN | WEIGHT: 259 LBS | BODY MASS INDEX: 39.25 KG/M2 | TEMPERATURE: 98.3 F

## 2020-05-28 DIAGNOSIS — H81.11 BENIGN PAROXYSMAL POSITIONAL VERTIGO OF RIGHT EAR: Primary | ICD-10-CM

## 2020-05-28 DIAGNOSIS — I10 ESSENTIAL HYPERTENSION WITH GOAL BLOOD PRESSURE LESS THAN 140/90: ICD-10-CM

## 2020-05-28 PROCEDURE — 90471 IMMUNIZATION ADMIN: CPT | Performed by: PHYSICIAN ASSISTANT

## 2020-05-28 PROCEDURE — 99213 OFFICE O/P EST LOW 20 MIN: CPT | Mod: 25 | Performed by: PHYSICIAN ASSISTANT

## 2020-05-28 PROCEDURE — 90715 TDAP VACCINE 7 YRS/> IM: CPT | Performed by: PHYSICIAN ASSISTANT

## 2020-05-28 RX ORDER — MECLIZINE HYDROCHLORIDE 25 MG/1
25 TABLET ORAL 3 TIMES DAILY PRN
Qty: 30 TABLET | Refills: 1 | Status: SHIPPED | OUTPATIENT
Start: 2020-05-28 | End: 2021-07-08

## 2020-05-28 ASSESSMENT — MIFFLIN-ST. JEOR: SCORE: 1810.38

## 2020-05-28 NOTE — NURSING NOTE
Prior to immunization administration, verified patients identity using patient s name and date of birth. Please see Immunization Activity for additional information.     Screening Questionnaire for Adult Immunization    Are you sick today?   No   Do you have allergies to medications, food, a vaccine component or latex?   No   Have you ever had a serious reaction after receiving a vaccination?   No   Do you have a long-term health problem with heart, lung, kidney, or metabolic disease (e.g., diabetes), asthma, a blood disorder, no spleen, complement component deficiency, a cochlear implant, or a spinal fluid leak?  Are you on long-term aspirin therapy?   No   Do you have cancer, leukemia, HIV/AIDS, or any other immune system problem?   No   Do you have a parent, brother, or sister with an immune system problem?   No   In the past 3 months, have you taken medications that affect  your immune system, such as prednisone, other steroids, or anticancer drugs; drugs for the treatment of rheumatoid arthritis, Crohn s disease, or psoriasis; or have you had radiation treatments?   No   Have you had a seizure, or a brain or other nervous system problem?   No   During the past year, have you received a transfusion of blood or blood    products, or been given immune (gamma) globulin or antiviral drug?   No   For women: Are you pregnant or is there a chance you could become       pregnant during the next month?   No   Have you received any vaccinations in the past 4 weeks?   No     Immunization questionnaire answers were all negative.           Screening performed by Kanchan Watkins MA on 5/28/2020 at 10:03 AM.

## 2020-05-28 NOTE — PATIENT INSTRUCTIONS
Patient Education     Inner Ear Problems: Causes of Dizziness (Vertigo)       Benign positional vertigo (BPV)  This is the most common cause of vertigo. BPV is also called benign positional paroxysmal vertigo (BPPV). It happens when crystals in the ear canals shift into the wrong place. Vertigo usually occurs when you move your head in a certain way. This can happen when turning in bed, bending, or looking up. Because BPV comes on quickly, you should think about if you are safe to drive or do other tasks that need your full attention.  BPV:    Causes vertigo that last for seconds. Vertigo can occur several times a day, depending on body position.    Doesn t cause hearing loss    Often goes away on its own. But it but may go away sooner with treatment.  Infection or inflammation  Sometimes the semicircular canals swell and send incorrect balance signals. This problem may be caused by a viral infection. Depending on the cause, your hearing can be affected (labyrinthitis). Or your hearing can remain normal (neuronitis).  Infection or inflammation:    Causes vertigo that lasts for hours or days. The first episode is usually the worst.    Can cause hearing loss    Often goes away on its own. But it may go away sooner with treatment.  You may need vestibular rehabilitation if you have balance problems that don't go away.  Meniere s disease  This condition is uncommon. It happens when there is too much fluid in the ear canals. This causes increased pressure and swelling. It affects balance and hearing signals.  Meniere s disease may:    Cause vertigo that last for hours    Cause hearing problems that come and go. The problems are usually in one ear and get worse over time.    Cause buzzing or ringing in the ears (tinnitus)    Cause a feeling of fullness or pressure in the ear    Cause any of these symptoms: vertigo, hearing loss, tinnitus, or ear fullness to last a lifetime  Date Last Reviewed: 11/1/2016 2000-2019 [employer]. [address] [place] [address], [place] [address]. All rights reserved. This information is not intended as a substitute for professional medical care. Always follow your healthcare professional's instructions.

## 2020-05-28 NOTE — TELEPHONE ENCOUNTER
Please call Isaura and let her know that I reviewed her repeat blood pressure done when she was here and was not able to catch her before she left.     She had mentioned that she had caffeine prior to her appointment. Please have her come for a drive up blood pressure check in 2 weeks to repeat. If it is still high, then I will need to adjust her medication.     Kristen Kehr PA-C

## 2020-05-28 NOTE — TELEPHONE ENCOUNTER
Patient notified of the provider's message as written below.  She was asked not to take in caffeine prior to the visit. She was assisted in making a follow up appointment as listed below. Patient verbalizes good understanding, agrees with plan and states she needs no further support. Coretta Travis R.N.      Next 5 appointments (look out 90 days)    Jun 11, 2020  9:00 AM CDT  Nurse Only with AN DRIVE UP ANCILLARY  [facility] [address] [facility] [address] [phone]

## 2020-06-04 DIAGNOSIS — I10 ESSENTIAL HYPERTENSION WITH GOAL BLOOD PRESSURE LESS THAN 140/90: ICD-10-CM

## 2020-06-05 ENCOUNTER — VIRTUAL VISIT (OUTPATIENT)
Dept: PHYSICAL THERAPY | Facility: CLINIC | Age: 55
End: 2020-06-05
Attending: PHYSICIAN ASSISTANT
Payer: COMMERCIAL

## 2020-06-05 DIAGNOSIS — R29.818 NEUROLOGICAL SIGNS: ICD-10-CM

## 2020-06-05 DIAGNOSIS — H81.11 BPPV (BENIGN PAROXYSMAL POSITIONAL VERTIGO), RIGHT: ICD-10-CM

## 2020-06-05 DIAGNOSIS — H81.11 BENIGN PAROXYSMAL POSITIONAL VERTIGO OF RIGHT EAR: ICD-10-CM

## 2020-06-05 PROCEDURE — 97161 PT EVAL LOW COMPLEX 20 MIN: CPT | Mod: GT | Performed by: PHYSICAL THERAPIST

## 2020-06-05 PROCEDURE — 95992 CANALITH REPOSITIONING PROC: CPT | Mod: GT | Performed by: PHYSICAL THERAPIST

## 2020-06-05 RX ORDER — AMLODIPINE BESYLATE 2.5 MG/1
TABLET ORAL
Qty: 30 TABLET | Refills: 0 | Status: SHIPPED | OUTPATIENT
Start: 2020-06-05 | End: 2020-07-06

## 2020-06-05 NOTE — TELEPHONE ENCOUNTER
See last ov note and telephone message.  Pt has ancillary appointment for recheck.  She does not have enough med to get to appointment per chart.  Florence COCHRANN, RN

## 2020-06-05 NOTE — PROGRESS NOTES
"Physical Therapy Virtual Initial Visit      The patient has been notified of following:     \"This virtual visit will be conducted between you and your provider. We have found that certain health care needs can be provided without the need for physical presence.  This service lets us provide the care you need with a virtual visit.\"    Due to external, as well as internal [facility] management of the COVID-19 Virus, Isaura Mcmahan was not seen in our clinic.  As a substitution, we implemented a virtual visit to manage this patient's condition utilizing the [employer] virtual visit platform via the patient s existing code.  The provider, Enoc Joseph, reviewed the patient's chart, PTRx prescription, and spoke with the patient to determine the following telemedicine visit is appropriate and effective for the patient's care.    The following type of visit was completed:   Video Visit:  The [employer] platform uses a synchronous HIPAA compliant video stream for this patient encounter.         Subjective:  The history is provided by the patient. No  was used.   Patient Health History  Isaura Mcmahan being seen for vertigo.     Problem began: 5/26/2020.   Problem occurred: unknown   Pain is reported as 0/10 on pain scale.  General health as reported by patient is good.  Pertinent medical history includes: high blood pressure and overweight.   Red flags:  None as reported by patient.   Other medical allergies details: amoxicillin.   Surgeries include:  None.     Other medications details: meclizine, zocor.       Primary job tasks include:  Computer work.                S:  Mark is a 54 year old patient complaining of vertigo.  States she gets a spinning sensation with certain movements.  Denies HA, changes in vision or hearing/tinnitus.  Can get nauseous but no vomiting  Onset of symptoms: 6-8 weeks ago for unknown reasons      Is this a recurrent problem: no  Progression since onset: no " change  Frequency of episodes/time of day: usually laying down at night  Duration of episodes: 10-30 seconds  Exacerbating factors: laying down flat, rolling to R  Relieving factors: get out of exacerbating positions  Previous treatments:  n/a  Special tests/diagnostics performed: none    O:  Cervical ROM screen: Functional AROM  Oculomotor/gaze stability screen: not tested over video at this time  Vertebral artery test: negative  Hallpike-[place] maneuver:  R: positive for reproduction of sxs  L: negative  Horizontal canal test:  R: not tested  L: not tested              Objective:    System  Physical Exam  General   ROS    PTRx Content from today's visit:  Instructed in CRMs.  Pt was able to perform them for treatment of R sided dysfunction over video and with her  supervising x2    Assessment/Plan:     Patient is a 54 year old female with vestibular complaints.    Patient has the following significant findings with corresponding treatment plan.                Diagnosis 1:  BPPV  Dizziness- CRMs    Cumulative Therapy Evaluation is: Low complexity.    Previous and current functional limitations:  (See Goal Flow Sheet for this information)    Short term and Long term goals: (See Goal Flow Sheet for this information)     Communication ability:  Patient appears to be able to clearly communicate and understand verbal and written communication and follow directions correctly.  Treatment Explanation - The following has been discussed with the patient:   RX ordered/plan of care  Anticipated outcomes  Possible risks and side effects  This patient would benefit from PT intervention to resume normal activities.   Rehab potential is good.    Frequency:  1 X week, once daily  Duration:  for 2 weeks  Discharge Plan:  Achieve all LTG.  Independent in home treatment program.  Reach maximal therapeutic benefit.    Please refer to the daily flowsheet for treatment today, total treatment time and time spent performing 1:1 timed  codes.       Virtual visit contact time    Time of service began: 3:50 PM  Time of service ended: 4:25 PM  Total Time for set up, visit, and documentation: 45 minutes    Payor: [employer] / Plan: "[employer]" OPEN ACCESS / Product Type: HMO /     Procedure Code/s   Evaluation: 20 minutes  CRMs: 15 min    I have reviewed the note as documented above.  This accurately captures the substance of my conversation with the patient.  Provider location: [facility] [place] ([facility]/State)  Patient location: [facility] [place]    ___________________________________________________

## 2020-06-09 PROBLEM — H81.11 BPPV (BENIGN PAROXYSMAL POSITIONAL VERTIGO), RIGHT: Status: RESOLVED | Noted: 2020-06-05 | Resolved: 2020-06-09

## 2020-06-09 PROBLEM — R29.818 NEUROLOGICAL SIGNS: Status: RESOLVED | Noted: 2020-06-05 | Resolved: 2020-06-09

## 2020-06-12 ENCOUNTER — ALLIED HEALTH/NURSE VISIT (OUTPATIENT)
Dept: NURSING | Facility: CLINIC | Age: 55
End: 2020-06-12
Payer: COMMERCIAL

## 2020-06-12 VITALS — SYSTOLIC BLOOD PRESSURE: 129 MMHG | HEART RATE: 55 BPM | DIASTOLIC BLOOD PRESSURE: 87 MMHG

## 2020-06-12 DIAGNOSIS — I10 HYPERTENSION GOAL BP (BLOOD PRESSURE) < 140/90: Primary | ICD-10-CM

## 2020-06-12 PROCEDURE — 99207 ZZC NO CHARGE NURSE ONLY: CPT

## 2020-06-12 NOTE — PROGRESS NOTES
Patient consents to receive outdoor care: Yes    Upon arrival, patient instructed to proceed to designated location, place vehicle in park, turn off, and remove keys     If we are unable to safely and ergonomically able to provide care- is the patient able to safely able to get out of car and transfer to a chair? Yes        Patient would like to receive their AVS not given.    Elma Gonzales CMA      Isaura Mcmahan is a 55 year old patient who comes in today for a Blood Pressure check.  Initial BP:  /87   Pulse 55      55  Disposition: follow-up as previously indicated by provider and results routed to provider    Elma Gonzales CMA

## 2020-07-27 DIAGNOSIS — E87.6 DIURETIC-INDUCED HYPOKALEMIA: ICD-10-CM

## 2020-07-27 DIAGNOSIS — I10 ESSENTIAL HYPERTENSION WITH GOAL BLOOD PRESSURE LESS THAN 140/90: ICD-10-CM

## 2020-07-27 DIAGNOSIS — T50.2X5A DIURETIC-INDUCED HYPOKALEMIA: ICD-10-CM

## 2020-07-28 RX ORDER — POTASSIUM CHLORIDE 750 MG/1
TABLET, EXTENDED RELEASE ORAL
Qty: 90 TABLET | Refills: 0 | Status: SHIPPED | OUTPATIENT
Start: 2020-07-28 | End: 2020-11-03

## 2020-07-28 RX ORDER — HYDROCHLOROTHIAZIDE 25 MG/1
TABLET ORAL
Qty: 90 TABLET | Refills: 0 | Status: SHIPPED | OUTPATIENT
Start: 2020-07-28 | End: 2020-11-03

## 2020-08-31 DIAGNOSIS — I10 ESSENTIAL HYPERTENSION WITH GOAL BLOOD PRESSURE LESS THAN 140/90: ICD-10-CM

## 2020-08-31 DIAGNOSIS — R73.03 PREDIABETES: Primary | ICD-10-CM

## 2020-08-31 DIAGNOSIS — E78.5 HYPERLIPIDEMIA LDL GOAL <130: ICD-10-CM

## 2020-08-31 RX ORDER — SIMVASTATIN 20 MG
TABLET ORAL
Qty: 90 TABLET | Refills: 0 | Status: SHIPPED | OUTPATIENT
Start: 2020-08-31 | End: 2020-12-07

## 2020-08-31 NOTE — TELEPHONE ENCOUNTER
She will be due to be seen in November.   Please contact her to have her schedule fasting lab appointment and office visit in November 2020.   Refill sent x 90 days for the simvastatin.   Kristen Kehr PA-C

## 2020-08-31 NOTE — LETTER
September 1, 2020    Isaura WRIGHT [facility]  [address] [place] [address]    Dear Isaura,       We recently received a refill request for simvastatin (ZOCOR) 20 MG tablet.  We have refilled this for a one time 90 day supply only because you are due for a:    Medication check office visit and fasting lab appointment      Please schedule this lab appointment 4-5 days prior to the office visit.     Please call at your earliest convenience so that there will not be a delay with your future refills.          Thank you,   Your [facility] Team/RB  [phone]

## 2020-08-31 NOTE — TELEPHONE ENCOUNTER
Routing refill request to provider for review/approval because:  Labs not current:  Lipids, BMP    Hanh Shane BSN, RN

## 2020-11-02 DIAGNOSIS — E87.6 DIURETIC-INDUCED HYPOKALEMIA: ICD-10-CM

## 2020-11-02 DIAGNOSIS — I10 ESSENTIAL HYPERTENSION WITH GOAL BLOOD PRESSURE LESS THAN 140/90: ICD-10-CM

## 2020-11-02 DIAGNOSIS — T50.2X5A DIURETIC-INDUCED HYPOKALEMIA: ICD-10-CM

## 2020-11-02 NOTE — LETTER
November 3, 2020    Isaura WRIGHT [facility]  [address] [place] [address]    Dear Isaura,       We recently received a refill request for hydrochlorothiazide (HYDRODIURIL) and potassium chloride ER (K-TAB/KLOR-CON) .  We have refilled this for a one time 30 day supply only because you are due for a:    Medication refill office visit and Fasting lab appointment      Please schedule this lab appointment 4-5 days prior to the office visit.     Please call at your earliest convenience so that there will not be a delay with your future refills.          Thank you,   Your [facility] Team/mkr  [phone]

## 2020-11-02 NOTE — TELEPHONE ENCOUNTER
Routing refill request to provider for review/approval because:  Labs not current:  Bmp  Florence Shannon BSN, RN

## 2020-11-03 RX ORDER — POTASSIUM CHLORIDE 750 MG/1
TABLET, EXTENDED RELEASE ORAL
Qty: 30 TABLET | Refills: 0 | Status: SHIPPED | OUTPATIENT
Start: 2020-11-03 | End: 2020-11-30

## 2020-11-03 RX ORDER — HYDROCHLOROTHIAZIDE 25 MG/1
TABLET ORAL
Qty: 30 TABLET | Refills: 0 | Status: SHIPPED | OUTPATIENT
Start: 2020-11-03 | End: 2020-11-30

## 2020-11-03 NOTE — TELEPHONE ENCOUNTER
She has been given refills and letters that stated she needs an appointment in November.   30 days of medication given so that she has time to get in for fasting tests and office visit.   Please contact her to schedule  Kristen Kehr PA-C

## 2020-11-16 DIAGNOSIS — I10 ESSENTIAL HYPERTENSION WITH GOAL BLOOD PRESSURE LESS THAN 140/90: ICD-10-CM

## 2020-11-16 RX ORDER — AMLODIPINE BESYLATE 2.5 MG/1
2.5 TABLET ORAL DAILY
Qty: 30 TABLET | Refills: 0 | Status: SHIPPED | OUTPATIENT
Start: 2020-11-16 | End: 2020-12-07

## 2020-11-16 NOTE — TELEPHONE ENCOUNTER
Routing refill request to provider for review/approval because:  Labs not current:  Kyler Shannon BSN, RN

## 2020-11-30 DIAGNOSIS — E87.6 DIURETIC-INDUCED HYPOKALEMIA: ICD-10-CM

## 2020-11-30 DIAGNOSIS — I10 ESSENTIAL HYPERTENSION WITH GOAL BLOOD PRESSURE LESS THAN 140/90: ICD-10-CM

## 2020-11-30 DIAGNOSIS — T50.2X5A DIURETIC-INDUCED HYPOKALEMIA: ICD-10-CM

## 2020-11-30 NOTE — LETTER
December 3, 2020    Isaura WRIGHT [facility]  [address] [place] [address]    Dear Isaura,       We recently received a refill request for potassium chloride ER (K-TAB/KLOR-CON) and hydrochlorothiazide (HYDRODIURIL).  We have refilled this for 30 day supply only because you are due for a:    Medication refills office visit and Fasting lab appointment      Please schedule this lab appointment 4-5 days prior to the office visit.     Please call at your earliest convenience so that there will not be a delay with your future refills.          Thank you,   Your [facility] Team/mkr  [phone]             Ambulatory

## 2020-12-01 NOTE — TELEPHONE ENCOUNTER
Routing refill request to provider for review/approval because:  Labs not current:  Creatinine, potassium, sodium    Hanh Shane BSN, RN

## 2020-12-02 RX ORDER — POTASSIUM CHLORIDE 750 MG/1
TABLET, EXTENDED RELEASE ORAL
Qty: 30 TABLET | Refills: 0 | Status: SHIPPED | OUTPATIENT
Start: 2020-12-02 | End: 2020-12-31

## 2020-12-02 RX ORDER — HYDROCHLOROTHIAZIDE 25 MG/1
25 TABLET ORAL DAILY
Qty: 30 TABLET | Refills: 0 | Status: SHIPPED | OUTPATIENT
Start: 2020-12-02 | End: 2020-12-31

## 2020-12-02 NOTE — TELEPHONE ENCOUNTER
She will need to have an office visit within the next month in order to continue to refill her medications.   Fasting lab tests should be completed prior to her appointment.   Refill sent x 30 days.   Kristen Kehr PA-C

## 2020-12-06 ENCOUNTER — MYC MEDICAL ADVICE (OUTPATIENT)
Dept: FAMILY MEDICINE | Facility: CLINIC | Age: 55
End: 2020-12-06

## 2020-12-06 ENCOUNTER — HEALTH MAINTENANCE LETTER (OUTPATIENT)
Age: 55
End: 2020-12-06

## 2020-12-06 DIAGNOSIS — I10 ESSENTIAL HYPERTENSION WITH GOAL BLOOD PRESSURE LESS THAN 140/90: ICD-10-CM

## 2020-12-06 DIAGNOSIS — E78.5 HYPERLIPIDEMIA LDL GOAL <130: ICD-10-CM

## 2020-12-06 RX ORDER — AMLODIPINE BESYLATE 2.5 MG/1
2.5 TABLET ORAL DAILY
Qty: 30 TABLET | Refills: 0 | Status: CANCELLED | OUTPATIENT
Start: 2020-12-06

## 2020-12-07 RX ORDER — AMLODIPINE BESYLATE 2.5 MG/1
2.5 TABLET ORAL DAILY
Qty: 30 TABLET | Refills: 0 | Status: SHIPPED | OUTPATIENT
Start: 2020-12-07 | End: 2021-01-05

## 2020-12-07 RX ORDER — SIMVASTATIN 20 MG
20 TABLET ORAL AT BEDTIME
Qty: 30 TABLET | Refills: 0 | Status: SHIPPED | OUTPATIENT
Start: 2020-12-07 | End: 2021-03-30

## 2020-12-28 ENCOUNTER — MYC MEDICAL ADVICE (OUTPATIENT)
Dept: FAMILY MEDICINE | Facility: CLINIC | Age: 55
End: 2020-12-28

## 2020-12-29 ENCOUNTER — ALLIED HEALTH/NURSE VISIT (OUTPATIENT)
Dept: NURSING | Facility: CLINIC | Age: 55
End: 2020-12-29
Payer: COMMERCIAL

## 2020-12-29 DIAGNOSIS — E87.6 DIURETIC-INDUCED HYPOKALEMIA: ICD-10-CM

## 2020-12-29 DIAGNOSIS — T50.2X5A DIURETIC-INDUCED HYPOKALEMIA: ICD-10-CM

## 2020-12-29 DIAGNOSIS — I10 BENIGN ESSENTIAL HYPERTENSION: Primary | ICD-10-CM

## 2020-12-29 DIAGNOSIS — I10 ESSENTIAL HYPERTENSION WITH GOAL BLOOD PRESSURE LESS THAN 140/90: ICD-10-CM

## 2020-12-29 PROCEDURE — 99207 PR NO CHARGE NURSE ONLY: CPT

## 2020-12-29 NOTE — PROGRESS NOTES
Isaura ADRIANA Mcmahan comes in today to recheck her blood pressure.  Blood pressure medications on the med list were reviewed with patient.    Patient has taken all medications as per usual regimen: yes  Patient reports tolerating them without any issues or concerns: yes    There were no vitals filed for this visit.    Blood pressure was taken, previous encounter was reviewed, 120/64  BP Readings from Last 6 Encounters:   06/12/20 129/87   05/28/20 (!) 147/88   08/06/19 126/83   06/12/18 102/70   03/07/18 143/81   10/12/17 128/82     Advised to follow up as previously discussed  Joleen Bentley MA on 12/29/2020 at 10:03 AM

## 2020-12-31 RX ORDER — HYDROCHLOROTHIAZIDE 25 MG/1
TABLET ORAL
Qty: 30 TABLET | Refills: 0 | Status: SHIPPED | OUTPATIENT
Start: 2020-12-31 | End: 2021-01-05

## 2020-12-31 RX ORDER — POTASSIUM CHLORIDE 750 MG/1
TABLET, EXTENDED RELEASE ORAL
Qty: 30 TABLET | Refills: 0 | Status: SHIPPED | OUTPATIENT
Start: 2020-12-31 | End: 2021-01-05

## 2021-01-02 DIAGNOSIS — T50.2X5A DIURETIC-INDUCED HYPOKALEMIA: ICD-10-CM

## 2021-01-02 DIAGNOSIS — E87.6 DIURETIC-INDUCED HYPOKALEMIA: ICD-10-CM

## 2021-01-02 DIAGNOSIS — I10 ESSENTIAL HYPERTENSION WITH GOAL BLOOD PRESSURE LESS THAN 140/90: ICD-10-CM

## 2021-01-05 RX ORDER — AMLODIPINE BESYLATE 2.5 MG/1
TABLET ORAL
Qty: 90 TABLET | Refills: 1 | Status: SHIPPED | OUTPATIENT
Start: 2021-01-05 | End: 2021-06-30

## 2021-01-05 RX ORDER — POTASSIUM CHLORIDE 750 MG/1
TABLET, EXTENDED RELEASE ORAL
Qty: 90 TABLET | Refills: 1 | Status: SHIPPED | OUTPATIENT
Start: 2021-01-05 | End: 2021-07-08

## 2021-01-05 RX ORDER — HYDROCHLOROTHIAZIDE 25 MG/1
25 TABLET ORAL DAILY
Qty: 90 TABLET | Refills: 1 | Status: SHIPPED | OUTPATIENT
Start: 2021-01-05 | End: 2021-07-08

## 2021-01-05 NOTE — TELEPHONE ENCOUNTER
Routing refill request to provider for review/approval because:  Labs not current:  Creatinine    Hanh Shane BSN, RN

## 2021-01-10 DIAGNOSIS — I10 ESSENTIAL HYPERTENSION WITH GOAL BLOOD PRESSURE LESS THAN 140/90: ICD-10-CM

## 2021-01-10 DIAGNOSIS — R73.03 PREDIABETES: ICD-10-CM

## 2021-01-10 DIAGNOSIS — E78.5 HYPERLIPIDEMIA LDL GOAL <130: ICD-10-CM

## 2021-01-10 LAB — HBA1C MFR BLD: 5.6 % (ref 0–5.6)

## 2021-01-10 PROCEDURE — 80048 BASIC METABOLIC PNL TOTAL CA: CPT | Performed by: PHYSICIAN ASSISTANT

## 2021-01-10 PROCEDURE — 80061 LIPID PANEL: CPT | Performed by: PHYSICIAN ASSISTANT

## 2021-01-10 PROCEDURE — 36415 COLL VENOUS BLD VENIPUNCTURE: CPT | Performed by: PHYSICIAN ASSISTANT

## 2021-01-10 PROCEDURE — 83036 HEMOGLOBIN GLYCOSYLATED A1C: CPT | Performed by: PHYSICIAN ASSISTANT

## 2021-01-11 LAB
ANION GAP SERPL CALCULATED.3IONS-SCNC: 3 MMOL/L (ref 3–14)
BUN SERPL-MCNC: 14 MG/DL (ref 7–30)
CALCIUM SERPL-MCNC: 9.5 MG/DL (ref 8.5–10.1)
CHLORIDE SERPL-SCNC: 111 MMOL/L (ref 94–109)
CHOLEST SERPL-MCNC: 200 MG/DL
CO2 SERPL-SCNC: 30 MMOL/L (ref 20–32)
CREAT SERPL-MCNC: 0.86 MG/DL (ref 0.52–1.04)
GFR SERPL CREATININE-BSD FRML MDRD: 76 ML/MIN/{1.73_M2}
GLUCOSE SERPL-MCNC: 103 MG/DL (ref 70–99)
HDLC SERPL-MCNC: 58 MG/DL
LDLC SERPL CALC-MCNC: 123 MG/DL
NONHDLC SERPL-MCNC: 142 MG/DL
POTASSIUM SERPL-SCNC: 3.5 MMOL/L (ref 3.4–5.3)
SODIUM SERPL-SCNC: 144 MMOL/L (ref 133–144)
TRIGL SERPL-MCNC: 96 MG/DL

## 2021-01-30 DIAGNOSIS — I10 ESSENTIAL HYPERTENSION WITH GOAL BLOOD PRESSURE LESS THAN 140/90: ICD-10-CM

## 2021-01-30 DIAGNOSIS — E87.6 DIURETIC-INDUCED HYPOKALEMIA: ICD-10-CM

## 2021-01-30 DIAGNOSIS — T50.2X5A DIURETIC-INDUCED HYPOKALEMIA: ICD-10-CM

## 2021-02-01 RX ORDER — POTASSIUM CHLORIDE 750 MG/1
TABLET, EXTENDED RELEASE ORAL
Qty: 30 TABLET | OUTPATIENT
Start: 2021-02-01

## 2021-02-01 RX ORDER — HYDROCHLOROTHIAZIDE 25 MG/1
TABLET ORAL
Qty: 30 TABLET | OUTPATIENT
Start: 2021-02-01

## 2021-03-30 DIAGNOSIS — E78.5 HYPERLIPIDEMIA LDL GOAL <130: ICD-10-CM

## 2021-03-30 RX ORDER — SIMVASTATIN 20 MG
TABLET ORAL
Qty: 90 TABLET | Refills: 3 | Status: SHIPPED | OUTPATIENT
Start: 2021-03-30 | End: 2022-04-04

## 2021-03-30 NOTE — TELEPHONE ENCOUNTER
This patient will need to be seen for a medication check for any further refills of medication.   Kristen Kehr PA-C

## 2021-04-06 ENCOUNTER — IMMUNIZATION (OUTPATIENT)
Dept: PEDIATRICS | Facility: CLINIC | Age: 56
End: 2021-04-06
Payer: COMMERCIAL

## 2021-04-06 PROCEDURE — 0001A PR COVID VAC PFIZER DIL RECON 30 MCG/0.3 ML IM: CPT

## 2021-04-06 PROCEDURE — 91300 PR COVID VAC PFIZER DIL RECON 30 MCG/0.3 ML IM: CPT

## 2021-04-27 ENCOUNTER — IMMUNIZATION (OUTPATIENT)
Dept: PEDIATRICS | Facility: CLINIC | Age: 56
End: 2021-04-27
Attending: INTERNAL MEDICINE
Payer: COMMERCIAL

## 2021-04-27 PROCEDURE — 91300 PR COVID VAC PFIZER DIL RECON 30 MCG/0.3 ML IM: CPT

## 2021-04-27 PROCEDURE — 0002A PR COVID VAC PFIZER DIL RECON 30 MCG/0.3 ML IM: CPT

## 2021-06-17 DIAGNOSIS — Z12.31 VISIT FOR SCREENING MAMMOGRAM: ICD-10-CM

## 2021-06-17 PROCEDURE — 77063 BREAST TOMOSYNTHESIS BI: CPT | Mod: TC | Performed by: RADIOLOGY

## 2021-06-17 PROCEDURE — 77067 SCR MAMMO BI INCL CAD: CPT | Mod: TC | Performed by: RADIOLOGY

## 2021-06-19 ENCOUNTER — MYC MEDICAL ADVICE (OUTPATIENT)
Dept: FAMILY MEDICINE | Facility: CLINIC | Age: 56
End: 2021-06-19

## 2021-06-19 DIAGNOSIS — E78.5 HYPERLIPIDEMIA LDL GOAL <130: Primary | ICD-10-CM

## 2021-06-19 DIAGNOSIS — I10 ESSENTIAL HYPERTENSION WITH GOAL BLOOD PRESSURE LESS THAN 140/90: ICD-10-CM

## 2021-06-19 DIAGNOSIS — R73.03 PREDIABETES: ICD-10-CM

## 2021-06-22 NOTE — TELEPHONE ENCOUNTER
To Provider,  Patient is scheduled for future fasting labs and a physical.  Please review lab orders, sign and close encounter.  Ibeth Ralph

## 2021-06-22 NOTE — TELEPHONE ENCOUNTER
Patient called back. I have scheduled her for fasting labs on 7/2/2021 and a physical on 7/8/2021.  Ibeth Ralph

## 2021-06-22 NOTE — TELEPHONE ENCOUNTER
Called the patient @ [phone]. Left a VM stating I was contacting her to schedule an appt. for her with K. Kehr for a physical. Gave my direct number to call back.  Ibeth Ralph

## 2021-06-30 DIAGNOSIS — I10 ESSENTIAL HYPERTENSION WITH GOAL BLOOD PRESSURE LESS THAN 140/90: ICD-10-CM

## 2021-06-30 RX ORDER — AMLODIPINE BESYLATE 2.5 MG/1
TABLET ORAL
Qty: 30 TABLET | Refills: 0 | Status: SHIPPED | OUTPATIENT
Start: 2021-06-30 | End: 2021-07-08

## 2021-07-02 DIAGNOSIS — I10 ESSENTIAL HYPERTENSION WITH GOAL BLOOD PRESSURE LESS THAN 140/90: ICD-10-CM

## 2021-07-02 DIAGNOSIS — R73.03 PREDIABETES: ICD-10-CM

## 2021-07-02 DIAGNOSIS — E78.5 HYPERLIPIDEMIA LDL GOAL <130: ICD-10-CM

## 2021-07-02 LAB
ANION GAP SERPL CALCULATED.3IONS-SCNC: 4 MMOL/L (ref 3–14)
BUN SERPL-MCNC: 14 MG/DL (ref 7–30)
CALCIUM SERPL-MCNC: 9.1 MG/DL (ref 8.5–10.1)
CHLORIDE SERPL-SCNC: 109 MMOL/L (ref 94–109)
CHOLEST SERPL-MCNC: 173 MG/DL
CO2 SERPL-SCNC: 29 MMOL/L (ref 20–32)
CREAT SERPL-MCNC: 0.83 MG/DL (ref 0.52–1.04)
GFR SERPL CREATININE-BSD FRML MDRD: 79 ML/MIN/{1.73_M2}
GLUCOSE SERPL-MCNC: 99 MG/DL (ref 70–99)
HBA1C MFR BLD: 5.5 % (ref 0–5.6)
HDLC SERPL-MCNC: 55 MG/DL
LDLC SERPL CALC-MCNC: 104 MG/DL
NONHDLC SERPL-MCNC: 118 MG/DL
POTASSIUM SERPL-SCNC: 3.8 MMOL/L (ref 3.4–5.3)
SODIUM SERPL-SCNC: 142 MMOL/L (ref 133–144)
TRIGL SERPL-MCNC: 70 MG/DL

## 2021-07-02 PROCEDURE — 36415 COLL VENOUS BLD VENIPUNCTURE: CPT | Performed by: PHYSICIAN ASSISTANT

## 2021-07-02 PROCEDURE — 83036 HEMOGLOBIN GLYCOSYLATED A1C: CPT | Performed by: PHYSICIAN ASSISTANT

## 2021-07-02 PROCEDURE — 80048 BASIC METABOLIC PNL TOTAL CA: CPT | Performed by: PHYSICIAN ASSISTANT

## 2021-07-02 PROCEDURE — 80061 LIPID PANEL: CPT | Performed by: PHYSICIAN ASSISTANT

## 2021-07-06 ENCOUNTER — OFFICE VISIT (OUTPATIENT)
Dept: DERMATOLOGY | Facility: CLINIC | Age: 56
End: 2021-07-06
Payer: COMMERCIAL

## 2021-07-06 DIAGNOSIS — D18.01 CHERRY ANGIOMA: ICD-10-CM

## 2021-07-06 DIAGNOSIS — L72.0 MILIA: ICD-10-CM

## 2021-07-06 DIAGNOSIS — L73.2 HIDRADENITIS SUPPURATIVA: Primary | ICD-10-CM

## 2021-07-06 DIAGNOSIS — L73.8 SEBACEOUS HYPERPLASIA: ICD-10-CM

## 2021-07-06 DIAGNOSIS — L82.1 SEBORRHEIC KERATOSES: ICD-10-CM

## 2021-07-06 DIAGNOSIS — L57.8 SUN-DAMAGED SKIN: ICD-10-CM

## 2021-07-06 DIAGNOSIS — D22.9 NEVUS SPILUS: ICD-10-CM

## 2021-07-06 DIAGNOSIS — L81.4 SOLAR LENTIGO: ICD-10-CM

## 2021-07-06 DIAGNOSIS — D22.9 MULTIPLE BENIGN NEVI: ICD-10-CM

## 2021-07-06 PROCEDURE — 99214 OFFICE O/P EST MOD 30 MIN: CPT | Performed by: DERMATOLOGY

## 2021-07-06 RX ORDER — CLINDAMYCIN PHOSPHATE 10 UG/ML
LOTION TOPICAL
Qty: 60 ML | Refills: 11 | Status: SHIPPED | OUTPATIENT
Start: 2021-07-06 | End: 2022-09-08

## 2021-07-06 ASSESSMENT — PAIN SCALES - GENERAL: PAINLEVEL: NO PAIN (0)

## 2021-07-06 NOTE — PATIENT INSTRUCTIONS
I recommend applying a moisturizer every time your hands get wet and once in the morning and night. You should use a cream based moisturizer. I recommend Cerave or Vanicream.

## 2021-07-06 NOTE — LETTER
7/6/2021         RE: Isaura Mcmahan  [address] [place] [address]        Dear Colleague,    Thank you for referring your patient, Isaura Mcmahan, to the [facility]. Please see a copy of my visit note below.    [facility] Dermatology Note  Encounter Date: Jul 6, 2021  Office Visit     Dermatology Problem List:  1. Hidradenitis suppurativa, Quiñonez stage 1  - Past t/x: Doxycycline 100 mg BID x3 months  - Current t/x: BPO wash, clindamycin 1% daily    Family History: Negative.  Social History:works as an .   ____________________________________________    ASSESSMENT/PLAN:  # Sun damaged skin with solar lentigines. Chronic. Stable.   - Recommended sunscreens SPF #30 or greater, protective clothing and avoidance of tanning beds.     # Benign findings include: seborrheic keratoses, paul angioma, milia, sebaceous hyperplasia. Self limited, minor.   - No further intervention required. Patient to report changes.  - Patient reassured of the benign nature of these lesions.    # Multiple clinically benign nevi and nevus spilus. Chronic. Stable.   - No further intervention required. Patient to report changes.  - Patient reassured of the benign nature of these lesions.    # Hidradenitis suppurativa, mild or Quiñonez stage 1. S/p 3 months of doxy with a good response.  - Continue BPO wash daily in the shower, clinda 1% lotion to spot treat any areas after showering. Refilled today  - Consider surgical excision in the future for recurrent nodule on L medial thigh if no improvement with current tx    Procedures Performed:   None.    Follow-up: 2 year(s) for a skin check, or earlier for new or changing lesions    Staff and Scribe:     Scribe Disclosure:   I, Jose Antonio Reich, am serving as a scribe to document services personally performed by this physician, Dr. Zhanna Lozano, based on data collection and the provider's statements to me.     Provider  "Disclosure:   The documentation recorded by the scribe accurately reflects the services I personally performed and the decisions made by me.    Zhanna Lozano MD    Department of Dermatology  [facility]: Phone: [phone], Fax:[phone]  [facility]: Phone: [phone], Fax: [phone]    ____________________________________________    CC: Derm Problem (Isaura is here today for a skin check, pt states \"has concern on her forehead and right upper back\")    HPI:  Ms. Isaura Mcmahan is a(n) 56 year old female who presents today as a return patient for a skin check.    Last seen 2/6/2020 for HS. At that time, she was started on BPO wash to affected skin in the shower, dilute bleach baths, clindamycin 1% lotion and doxycycline 100 mg BID x3 months.    Today, she notes her HS has been doing generally well. She is using a BPO wash in the shower and clindamycin to spot treat. She does have a spot of concern on her forehead and right upper back.    Patient is otherwise feeling well, without additional concerns.    Labs:  NA    Physical exam:  Vitals: There were no vitals taken for this visit.  SKIN: Full skin exam was performed. The exam included the head/face, neck, both arms, chest, back, abdomen, both legs, groin, buttocks, digits and/or nails.   - Alcantar Type II  - Slightly thickened scar on the right posterior shoulder. Stable per patient.  - Scattered brown macules on sun exposed areas.  - Nevus spilus on left anterior thigh  - There are dome shaped bright red papules on the trunk.   - Multiple regular brown pigmented macules and papules are identified on the trunk and extremities.   - There are waxy stuck on tan to brown papules on the trunk and left frontal hairline.  - There are white 1-2 mm papules on the face.   - There are yellow oily papules with central umbilication " located on the face.  - A couple post inflammatory hyperpigmented macules on the bilateral medial thighs  - Superficial subcutaneous nodule 1 cm in size on the left medial thigh  - A couple comedonal openings on the bilateral medial thighs as well  - No other lesions of concern on areas examined.     Medications:  Current Outpatient Medications   Medication     amLODIPine (NORVASC) 2.5 MG tablet     clindamycin (CLEOCIN T) 1 % external lotion     hydrochlorothiazide (HYDRODIURIL) 25 MG tablet     meclizine (ANTIVERT) 25 MG tablet     potassium chloride ER (K-TAB/KLOR-CON) 10 MEQ CR tablet     simvastatin (ZOCOR) 20 MG tablet     No current facility-administered medications for this visit.       Past Medical History:   Patient Active Problem List   Diagnosis     Hyperlipidemia LDL goal <130     Hypertension goal BP (blood pressure) < 140/90     Obesity     CARON (obstructive sleep apnea)- mild (AHI 10)     Obesity (BMI 35.0-39.9) with comorbidity (H)     Past Medical History:   Diagnosis Date     ASCUS on Pap smear     2011, neg HPV     Cancer (H)      Hypertension         CC No referring provider defined for this encounter. on close of this encounter.      Again, thank you for allowing me to participate in the care of your patient.        Sincerely,        Zhanna Lozano MD

## 2021-07-06 NOTE — PROGRESS NOTES
[facility] Dermatology Note  Encounter Date: Jul 6, 2021  Office Visit     Dermatology Problem List:  1. Hidradenitis suppurativa, Quiñonez stage 1  - Past t/x: Doxycycline 100 mg BID x3 months  - Current t/x: BPO wash, clindamycin 1% daily    Family History: Negative.  Social History:works as an .   ____________________________________________    ASSESSMENT/PLAN:  # Sun damaged skin with solar lentigines. Chronic. Stable.   - Recommended sunscreens SPF #30 or greater, protective clothing and avoidance of tanning beds.     # Benign findings include: seborrheic keratoses, paul angioma, milia, sebaceous hyperplasia. Self limited, minor.   - No further intervention required. Patient to report changes.  - Patient reassured of the benign nature of these lesions.    # Multiple clinically benign nevi and nevus spilus. Chronic. Stable.   - No further intervention required. Patient to report changes.  - Patient reassured of the benign nature of these lesions.    # Hidradenitis suppurativa, mild or Quiñonez stage 1. S/p 3 months of doxy with a good response.  - Continue BPO wash daily in the shower, clinda 1% lotion to spot treat any areas after showering. Refilled today  - Consider surgical excision in the future for recurrent nodule on L medial thigh if no improvement with current tx    Procedures Performed:   None.    Follow-up: 2 year(s) for a skin check, or earlier for new or changing lesions    Staff and Scribe:     Scribe Disclosure:   I, Jose Antonio Reich, am serving as a scribe to document services personally performed by this physician, Dr. Zhanna Lozano, based on data collection and the provider's statements to me.     Provider Disclosure:   The documentation recorded by the scribe accurately reflects the services I personally performed and the decisions made by me.    Zhanna Lozano MD    Department of Dermatology  [facility]  "[facility]: Phone: [phone], Fax:[phone]  [facility]: Phone: [phone], Fax: [phone]    ____________________________________________    CC: Derm Problem (Isaura is here today for a skin check, pt states \"has concern on her forehead and right upper back\")    HPI:  Ms. Isaura Mcmahan is a(n) 56 year old female who presents today as a return patient for a skin check.    Last seen 2/6/2020 for HS. At that time, she was started on BPO wash to affected skin in the shower, dilute bleach baths, clindamycin 1% lotion and doxycycline 100 mg BID x3 months.    Today, she notes her HS has been doing generally well. She is using a BPO wash in the shower and clindamycin to spot treat. She does have a spot of concern on her forehead and right upper back.    Patient is otherwise feeling well, without additional concerns.    Labs:  NA    Physical exam:  Vitals: There were no vitals taken for this visit.  SKIN: Full skin exam was performed. The exam included the head/face, neck, both arms, chest, back, abdomen, both legs, groin, buttocks, digits and/or nails.   - Alcantar Type II  - Slightly thickened scar on the right posterior shoulder. Stable per patient.  - Scattered brown macules on sun exposed areas.  - Nevus spilus on left anterior thigh  - There are dome shaped bright red papules on the trunk.   - Multiple regular brown pigmented macules and papules are identified on the trunk and extremities.   - There are waxy stuck on tan to brown papules on the trunk and left frontal hairline.  - There are white 1-2 mm papules on the face.   - There are yellow oily papules with central umbilication located on the face.  - A couple post inflammatory hyperpigmented macules on the bilateral medial thighs  - Superficial subcutaneous nodule 1 cm in size on the left medial thigh  - A couple comedonal openings on the bilateral medial thighs as well  - No other " lesions of concern on areas examined.     Medications:  Current Outpatient Medications   Medication     amLODIPine (NORVASC) 2.5 MG tablet     clindamycin (CLEOCIN T) 1 % external lotion     hydrochlorothiazide (HYDRODIURIL) 25 MG tablet     meclizine (ANTIVERT) 25 MG tablet     potassium chloride ER (K-TAB/KLOR-CON) 10 MEQ CR tablet     simvastatin (ZOCOR) 20 MG tablet     No current facility-administered medications for this visit.       Past Medical History:   Patient Active Problem List   Diagnosis     Hyperlipidemia LDL goal <130     Hypertension goal BP (blood pressure) < 140/90     Obesity     CARON (obstructive sleep apnea)- mild (AHI 10)     Obesity (BMI 35.0-39.9) with comorbidity (H)     Past Medical History:   Diagnosis Date     ASCUS on Pap smear     2011, neg HPV     Cancer (H)      Hypertension         CC No referring provider defined for this encounter. on close of this encounter.

## 2021-07-06 NOTE — NURSING NOTE
"Isaura Mcmahan's goals for this visit include:   Chief Complaint   Patient presents with     Derm Problem     Isaura is here today for a skin check, pt states \"has concern on her forehead and right upper back\"       She requests these members of her care team be copied on today's visit information:     PCP: Kehr, Kristen M    Referring Provider:  No referring provider defined for this encounter.    There were no vitals taken for this visit.    Do you need any medication refills at today's visit? No    María Edge LPN      "

## 2021-07-08 ENCOUNTER — OFFICE VISIT (OUTPATIENT)
Dept: FAMILY MEDICINE | Facility: CLINIC | Age: 56
End: 2021-07-08
Payer: COMMERCIAL

## 2021-07-08 VITALS
HEART RATE: 58 BPM | OXYGEN SATURATION: 97 % | DIASTOLIC BLOOD PRESSURE: 67 MMHG | WEIGHT: 208 LBS | BODY MASS INDEX: 31.52 KG/M2 | SYSTOLIC BLOOD PRESSURE: 126 MMHG | TEMPERATURE: 96.2 F | HEIGHT: 68 IN

## 2021-07-08 DIAGNOSIS — I10 ESSENTIAL HYPERTENSION WITH GOAL BLOOD PRESSURE LESS THAN 140/90: ICD-10-CM

## 2021-07-08 DIAGNOSIS — Z23 NEED FOR SHINGLES VACCINE: ICD-10-CM

## 2021-07-08 DIAGNOSIS — E87.6 DIURETIC-INDUCED HYPOKALEMIA: ICD-10-CM

## 2021-07-08 DIAGNOSIS — T50.2X5A DIURETIC-INDUCED HYPOKALEMIA: ICD-10-CM

## 2021-07-08 DIAGNOSIS — Z00.00 ROUTINE GENERAL MEDICAL EXAMINATION AT A HEALTH CARE FACILITY: Primary | ICD-10-CM

## 2021-07-08 PROBLEM — E66.01 MORBID OBESITY (H): Status: RESOLVED | Noted: 2019-08-06 | Resolved: 2021-07-08

## 2021-07-08 PROCEDURE — 90471 IMMUNIZATION ADMIN: CPT | Performed by: PHYSICIAN ASSISTANT

## 2021-07-08 PROCEDURE — 90750 HZV VACC RECOMBINANT IM: CPT | Performed by: PHYSICIAN ASSISTANT

## 2021-07-08 PROCEDURE — 99396 PREV VISIT EST AGE 40-64: CPT | Mod: 25 | Performed by: PHYSICIAN ASSISTANT

## 2021-07-08 PROCEDURE — 99213 OFFICE O/P EST LOW 20 MIN: CPT | Mod: 25 | Performed by: PHYSICIAN ASSISTANT

## 2021-07-08 RX ORDER — POTASSIUM CHLORIDE 750 MG/1
TABLET, EXTENDED RELEASE ORAL
Qty: 90 TABLET | Refills: 1 | Status: SHIPPED | OUTPATIENT
Start: 2021-07-08 | End: 2022-01-03

## 2021-07-08 RX ORDER — HYDROCHLOROTHIAZIDE 25 MG/1
25 TABLET ORAL DAILY
Qty: 90 TABLET | Refills: 1 | Status: SHIPPED | OUTPATIENT
Start: 2021-07-08 | End: 2022-01-03

## 2021-07-08 RX ORDER — AMLODIPINE BESYLATE 2.5 MG/1
2.5 TABLET ORAL DAILY
Qty: 90 TABLET | Refills: 1 | Status: SHIPPED | OUTPATIENT
Start: 2021-07-08 | End: 2021-11-12

## 2021-07-08 ASSESSMENT — MIFFLIN-ST. JEOR: SCORE: 1569.04

## 2021-07-08 ASSESSMENT — ENCOUNTER SYMPTOMS
DYSURIA: 0
CONSTIPATION: 0
SHORTNESS OF BREATH: 0
BREAST MASS: 0
NAUSEA: 0
EYE PAIN: 0
ABDOMINAL PAIN: 0
CHILLS: 0
COUGH: 0
WEAKNESS: 0
NERVOUS/ANXIOUS: 0
ARTHRALGIAS: 0
MYALGIAS: 0
HEMATOCHEZIA: 0
JOINT SWELLING: 0
FREQUENCY: 0
DIZZINESS: 0
FEVER: 0
HEMATURIA: 0
PALPITATIONS: 0
HEADACHES: 0
PARESTHESIAS: 0
DIARRHEA: 0
SORE THROAT: 0
HEARTBURN: 0

## 2021-07-08 ASSESSMENT — PAIN SCALES - GENERAL: PAINLEVEL: NO PAIN (0)

## 2021-07-08 NOTE — NURSING NOTE
"Chief Complaint   Patient presents with     Physical       Initial /67   Pulse 58   Temp 96.2  F (35.7  C) (Tympanic)   Ht 1.715 m (5' 7.5\")   Wt 94.3 kg (208 lb)   SpO2 97%   BMI 32.10 kg/m   Estimated body mass index is 32.1 kg/m  as calculated from the following:    Height as of this encounter: 1.715 m (5' 7.5\").    Weight as of this encounter: 94.3 kg (208 lb).  Medication Reconciliation: complete    RICHA Newton MA    "

## 2021-07-08 NOTE — PROGRESS NOTES
SUBJECTIVE:   CC: Isaura Mcmahan is an 56 year old woman who presents for preventive health visit.       Patient has been advised of split billing requirements and indicates understanding: Yes  Healthy Habits:     Getting at least 3 servings of Calcium per day:  NO    Bi-annual eye exam:  NO    Dental care twice a year:  Yes    Sleep apnea or symptoms of sleep apnea:  None    Diet:  Regular (no restrictions)    Frequency of exercise:  None    Taking medications regularly:  Yes    Medication side effects:  None    PHQ-2 Total Score: 0    Additional concerns today:  No          PROBLEMS TO ADD ON...  Hypertension: she has lost nearly 60 pounds in the past year. She feels great. She has not been monitoring her blood pressure at home. She is taking the hydrochlorothiazide, potassium and the amlodipine.     Today's PHQ-2 Score:   PHQ-2 ( 1999 [employer]) 7/8/2021   Q1: Little interest or pleasure in doing things 0   Q2: Feeling down, depressed or hopeless 0   PHQ-2 Score 0   Q1: Little interest or pleasure in doing things Not at all   Q2: Feeling down, depressed or hopeless Not at all   PHQ-2 Score 0       Abuse: Current or Past (Physical, Sexual or Emotional) - No  Do you feel safe in your environment? Yes    Have you ever done Advance Care Planning? (For example, a Health Directive, POLST, or a discussion with a medical provider or your loved ones about your wishes): No, advance care planning information given to patient to review.  Patient declined advance care planning discussion at this time.    Social History     Tobacco Use     Smoking status: Never Smoker     Smokeless tobacco: Never Used     Tobacco comment: per chart   Substance Use Topics     Alcohol use: Yes     Comment: occassional     If you drink alcohol do you typically have >3 drinks per day or >7 drinks per week? No    Alcohol Use 7/8/2021   Prescreen: >3 drinks/day or >7 drinks/week? No   Prescreen: >3 drinks/day or >7 drinks/week? -       Reviewed  orders with patient.  Reviewed health maintenance and updated orders accordingly - Yes  BP Readings from Last 3 Encounters:   07/08/21 126/67   06/12/20 129/87   05/28/20 (!) 147/88    Wt Readings from Last 3 Encounters:   07/08/21 94.3 kg (208 lb)   05/28/20 117.5 kg (259 lb)   08/06/19 112.5 kg (248 lb)                  Patient Active Problem List   Diagnosis     Hyperlipidemia LDL goal <130     Hypertension goal BP (blood pressure) < 140/90     Obesity     CARON (obstructive sleep apnea)- mild (AHI 10)     Past Surgical History:   Procedure Laterality Date     COLONOSCOPY       COLONOSCOPY WITH CO2 INSUFFLATION N/A 5/19/2016    Procedure: COLONOSCOPY WITH CO2 INSUFFLATION;  Surgeon: Segundo Crane MD;  Location: [facility] OR     DRAIN PILONIDAL CYST SIMPL  04/2004    left wrist cyst removal     LAPAROSCOPIC TUBAL LIGATION         Social History     Tobacco Use     Smoking status: Never Smoker     Smokeless tobacco: Never Used     Tobacco comment: per chart   Substance Use Topics     Alcohol use: Yes     Comment: occassional     Family History   Problem Relation Age of Onset     Hypertension Mother      Hyperlipidemia Mother      Other Cancer Mother         Lung and Brain     Cancer Father         prostate     Prostate Cancer Father      Other Cancer Father         bone     Diabetes Maternal Grandmother      Cancer Brother         testicular     Other Cancer Brother         testicular/testicular     Hypertension Brother      Hyperlipidemia Brother      Prostate Cancer Brother          Current Outpatient Medications   Medication Sig Dispense Refill     amLODIPine (NORVASC) 2.5 MG tablet Take 1 tablet (2.5 mg) by mouth daily 90 tablet 1     clindamycin (CLEOCIN T) 1 % external lotion Apply thin layer once a day to affected areas. 60 mL 11     hydrochlorothiazide (HYDRODIURIL) 25 MG tablet Take 1 tablet (25 mg) by mouth daily 90 tablet 1     potassium chloride ER (K-TAB/KLOR-CON) 10 MEQ CR tablet TAKE 1 TABLET(10  MEQ) BY MOUTH DAILY 90 tablet 1     simvastatin (ZOCOR) 20 MG tablet TAKE 1 TABLET(20 MG) BY MOUTH AT BEDTIME 90 tablet 3     Allergies   Allergen Reactions     Amoxicillin Hives     Recent Labs   Lab Test 07/02/21  0819 01/10/21  0903 07/30/19  0737 03/29/16  0851 03/29/16  0851   A1C 5.5 5.6  --   --  5.8   * 123* 140*   < >  --    HDL 55 58 52   < >  --    TRIG 70 96 146   < >  --    CR 0.83 0.86 0.72   < > 0.73   GFRESTIMATED 79 76 >90   < > 84   GFRESTBLACK >90 88 >90   < > >90  African American GFR Calc     POTASSIUM 3.8 3.5 3.6   < > 3.4    < > = values in this interval not displayed.        Breast Cancer Screening:    Breast CA Risk Assessment (FHS-7) 7/8/2021   Do you have a family history of breast, colon, or ovarian cancer? No / Unknown         Mammogram Screening: Recommended mammography every 1-2 years with patient discussion and risk factor consideration  Pertinent mammograms are reviewed under the imaging tab.    History of abnormal Pap smear:   NO - age 30-65 PAP every 5 years with negative HPV co-testing recommended  Last 3 Pap and HPV Results:   PAP / HPV Latest Ref Rng & Units 8/6/2019 11/10/2016 5/8/2013   PAP - NIL NIL NIL   HPV 16 DNA NEG:Negative Negative Negative -   HPV 18 DNA NEG:Negative Negative Negative -   OTHER HR HPV NEG:Negative Negative Negative -     PAP / HPV Latest Ref Rng & Units 8/6/2019 11/10/2016 5/8/2013   PAP - NIL NIL NIL   HPV 16 DNA NEG:Negative Negative Negative -   HPV 18 DNA NEG:Negative Negative Negative -   OTHER HR HPV NEG:Negative Negative Negative -     Reviewed and updated as needed this visit by clinical staff  Tobacco  Allergies  Meds   Med Hx  Surg Hx  Fam Hx  Soc Hx        Reviewed and updated as needed this visit by Provider                Past Medical History:   Diagnosis Date     ASCUS on Pap smear     2011, neg HPV     Cancer (H)      Hypertension       Past Surgical History:   Procedure Laterality Date     COLONOSCOPY       COLONOSCOPY WITH  "CO2 INSUFFLATION N/A 2016    Procedure: COLONOSCOPY WITH CO2 INSUFFLATION;  Surgeon: Segundo Crane MD;  Location: [facility]     DRAIN PILONIDAL CYST SIMPL  2004    left wrist cyst removal     LAPAROSCOPIC TUBAL LIGATION       OB History    Para Term  AB Living   3 2 2 0 1 2   SAB TAB Ectopic Multiple Live Births   1 0 0 0 2      # Outcome Date GA Lbr Oniel/2nd Weight Sex Delivery Anes PTL Lv   3 Term 01 40w0d       MERA   2 SAB            1 Term 94 40w0d       MERA       Review of Systems   Constitutional: Negative for chills and fever.   HENT: Negative for congestion, ear pain, hearing loss and sore throat.    Eyes: Negative for pain and visual disturbance.   Respiratory: Negative for cough and shortness of breath.    Cardiovascular: Negative for chest pain, palpitations and peripheral edema.   Gastrointestinal: Negative for abdominal pain, constipation, diarrhea, heartburn, hematochezia and nausea.   Breasts:  Negative for tenderness, breast mass and discharge.   Genitourinary: Negative for dysuria, frequency, genital sores, hematuria, pelvic pain, urgency, vaginal bleeding and vaginal discharge.   Musculoskeletal: Negative for arthralgias, joint swelling and myalgias.   Skin: Negative for rash.   Neurological: Negative for dizziness, weakness, headaches and paresthesias.   Psychiatric/Behavioral: Negative for mood changes. The patient is not nervous/anxious.           OBJECTIVE:   /67   Pulse 58   Temp 96.2  F (35.7  C) (Tympanic)   Ht 1.715 m (5' 7.5\")   Wt 94.3 kg (208 lb)   SpO2 97%   BMI 32.10 kg/m    Physical Exam  GENERAL APPEARANCE: healthy, alert and no distress  EYES: Eyes grossly normal to inspection, PERRL and conjunctivae and sclerae normal  HENT: ear canals and TM's normal, nose and mouth without ulcers or lesions, oropharynx clear and oral mucous membranes moist  NECK: no adenopathy, no asymmetry, masses, or scars and thyroid normal to " palpation  RESP: lungs clear to auscultation - no rales, rhonchi or wheezes  BREAST: normal without masses, tenderness or nipple discharge and no palpable axillary masses or adenopathy  CV: regular rate and rhythm, normal S1 S2, no S3 or S4, no murmur, click or rub, no peripheral edema and peripheral pulses strong  ABDOMEN: soft, nontender, no hepatosplenomegaly, no masses and bowel sounds normal  MS: no musculoskeletal defects are noted and gait is age appropriate without ataxia  SKIN: no suspicious lesions or rashes  NEURO: Normal strength and tone, sensory exam grossly normal, mentation intact and speech normal  PSYCH: mentation appears normal and affect normal/bright    Diagnostic Test Results:  Labs reviewed in Epic    ASSESSMENT/PLAN:   1. Routine general medical examination at a health care facility  Health maintenance reviewed and updated.      2. Essential hypertension with goal blood pressure less than 140/90  Refills given, but she will continue to monitor her blood pressure at home and wean off of the amlodpine. She has lost 60 pounds and blood pressure is low today.   - amLODIPine (NORVASC) 2.5 MG tablet; Take 1 tablet (2.5 mg) by mouth daily  Dispense: 90 tablet; Refill: 1  - hydrochlorothiazide (HYDRODIURIL) 25 MG tablet; Take 1 tablet (25 mg) by mouth daily  Dispense: 90 tablet; Refill: 1    3. Diuretic-induced hypokalemia  Stable. Consider getting off of the hydrochlorothiazide as her weight loss journey continues.   - potassium chloride ER (K-TAB/KLOR-CON) 10 MEQ CR tablet; TAKE 1 TABLET(10 MEQ) BY MOUTH DAILY  Dispense: 90 tablet; Refill: 1    4. Need for shingles vaccine  Vaccine given     Patient has been advised of split billing requirements and indicates understanding: Yes  COUNSELING:  Reviewed preventive health counseling, as reflected in patient instructions       Regular exercise       Healthy diet/nutrition       Colon cancer screening    Estimated body mass index is 32.1 kg/m  as  "calculated from the following:    Height as of this encounter: 1.715 m (5' 7.5\").    Weight as of this encounter: 94.3 kg (208 lb).    Weight management plan: Discussed healthy diet and exercise guidelines    She reports that she has never smoked. She has never used smokeless tobacco.      Counseling Resources:  ATP IV Guidelines  Pooled Cohorts Equation Calculator  Breast Cancer Risk Calculator  BRCA-Related Cancer Risk Assessment: FHS-7 Tool  FRAX Risk Assessment  ICSI Preventive Guidelines  Dietary Guidelines for Americans, 2010  USDA's MyPlate  ASA Prophylaxis  Lung CA Screening    Kristen M. Kehr, PA-C [facility]  "

## 2021-07-28 DIAGNOSIS — I10 ESSENTIAL HYPERTENSION WITH GOAL BLOOD PRESSURE LESS THAN 140/90: ICD-10-CM

## 2021-07-28 RX ORDER — AMLODIPINE BESYLATE 2.5 MG/1
TABLET ORAL
Qty: 30 TABLET | OUTPATIENT
Start: 2021-07-28

## 2021-07-28 NOTE — TELEPHONE ENCOUNTER
Duplicate refill request amlodipine 2.5 mg.   This prescription was refilled on 7/8/21.   A 90 day supply + 1 refill was sent to the pharmacy on file.         Shakira Tyler RN  [facility]

## 2021-09-08 ENCOUNTER — ALLIED HEALTH/NURSE VISIT (OUTPATIENT)
Dept: FAMILY MEDICINE | Facility: CLINIC | Age: 56
End: 2021-09-08
Payer: COMMERCIAL

## 2021-09-08 DIAGNOSIS — Z23 NEED FOR SHINGLES VACCINE: Primary | ICD-10-CM

## 2021-09-08 PROCEDURE — 90471 IMMUNIZATION ADMIN: CPT

## 2021-09-08 PROCEDURE — 90750 HZV VACC RECOMBINANT IM: CPT

## 2021-09-08 PROCEDURE — 99207 PR NO CHARGE NURSE ONLY: CPT

## 2021-09-08 NOTE — PROGRESS NOTES
Prior to immunization administration, verified patients identity using patient s name and date of birth. Please see Immunization Activity for additional information.     Screening Questionnaire for Adult Immunization    Are you sick today?   No   Do you have allergies to medications, food, a vaccine component or latex?   No   Have you ever had a serious reaction after receiving a vaccination?   No   Do you have a long-term health problem with heart, lung, kidney, or metabolic disease (e.g., diabetes), asthma, a blood disorder, no spleen, complement component deficiency, a cochlear implant, or a spinal fluid leak?  Are you on long-term aspirin therapy?   No   Do you have cancer, leukemia, HIV/AIDS, or any other immune system problem?   No   Do you have a parent, brother, or sister with an immune system problem?   No   In the past 3 months, have you taken medications that affect  your immune system, such as prednisone, other steroids, or anticancer drugs; drugs for the treatment of rheumatoid arthritis, Crohn s disease, or psoriasis; or have you had radiation treatments?   No   Have you had a seizure, or a brain or other nervous system problem?   No   During the past year, have you received a transfusion of blood or blood    products, or been given immune (gamma) globulin or antiviral drug?   No   For women: Are you pregnant or is there a chance you could become       pregnant during the next month?   No   Have you received any vaccinations in the past 4 weeks?   No     Immunization questionnaire answers were all negative.        Per orders of Kristen Kehr, injection of Shingrix given by Miranda Tsai CMA. Patient instructed to remain in clinic for 15 minutes afterwards, and to report any adverse reaction to me immediately.       Screening performed by Miranda Tsai CMA on 9/8/2021 at 3:27 PM.

## 2021-09-26 ENCOUNTER — HEALTH MAINTENANCE LETTER (OUTPATIENT)
Age: 56
End: 2021-09-26

## 2021-11-11 ENCOUNTER — E-VISIT (OUTPATIENT)
Dept: FAMILY MEDICINE | Facility: CLINIC | Age: 56
End: 2021-11-11
Payer: COMMERCIAL

## 2021-11-11 DIAGNOSIS — I10 ESSENTIAL HYPERTENSION WITH GOAL BLOOD PRESSURE LESS THAN 140/90: ICD-10-CM

## 2021-11-11 DIAGNOSIS — T75.3XXA MOTION SICKNESS, INITIAL ENCOUNTER: Primary | ICD-10-CM

## 2021-11-11 PROCEDURE — 99421 OL DIG E/M SVC 5-10 MIN: CPT | Performed by: PHYSICIAN ASSISTANT

## 2021-11-12 ENCOUNTER — E-VISIT (OUTPATIENT)
Dept: FAMILY MEDICINE | Facility: CLINIC | Age: 56
End: 2021-11-12
Payer: COMMERCIAL

## 2021-11-12 DIAGNOSIS — Z53.9 DIAGNOSIS NOT YET DEFINED: Primary | ICD-10-CM

## 2021-11-12 RX ORDER — SCOLOPAMINE TRANSDERMAL SYSTEM 1 MG/1
1 PATCH, EXTENDED RELEASE TRANSDERMAL
Qty: 4 PATCH | Refills: 0 | Status: SHIPPED | OUTPATIENT
Start: 2021-11-12 | End: 2022-09-08

## 2021-11-12 RX ORDER — AMLODIPINE BESYLATE 2.5 MG/1
2.5 TABLET ORAL DAILY
Qty: 90 TABLET | Refills: 1 | Status: SHIPPED | OUTPATIENT
Start: 2021-11-12 | End: 2022-05-11

## 2021-11-21 NOTE — TELEPHONE ENCOUNTER
This encounter is the completion of an evisit started on the same day. It was erroneously split from the original encounter. This one will be administratively closed.

## 2021-12-31 DIAGNOSIS — T50.2X5A DIURETIC-INDUCED HYPOKALEMIA: ICD-10-CM

## 2021-12-31 DIAGNOSIS — E87.6 DIURETIC-INDUCED HYPOKALEMIA: ICD-10-CM

## 2021-12-31 DIAGNOSIS — I10 ESSENTIAL HYPERTENSION WITH GOAL BLOOD PRESSURE LESS THAN 140/90: ICD-10-CM

## 2022-01-03 RX ORDER — HYDROCHLOROTHIAZIDE 25 MG/1
TABLET ORAL
Qty: 90 TABLET | Refills: 1 | Status: SHIPPED | OUTPATIENT
Start: 2022-01-03 | End: 2022-07-05

## 2022-01-03 RX ORDER — POTASSIUM CHLORIDE 750 MG/1
TABLET, EXTENDED RELEASE ORAL
Qty: 90 TABLET | Refills: 1 | Status: SHIPPED | OUTPATIENT
Start: 2022-01-03 | End: 2022-07-05

## 2022-01-18 ENCOUNTER — IMMUNIZATION (OUTPATIENT)
Dept: NURSING | Facility: CLINIC | Age: 57
End: 2022-01-18
Payer: COMMERCIAL

## 2022-01-18 PROCEDURE — 0054A COVID-19,PF,PFIZER (12+ YRS): CPT

## 2022-01-18 PROCEDURE — 91305 COVID-19,PF,PFIZER (12+ YRS): CPT

## 2022-01-20 ENCOUNTER — ALLIED HEALTH/NURSE VISIT (OUTPATIENT)
Dept: FAMILY MEDICINE | Facility: CLINIC | Age: 57
End: 2022-01-20
Payer: COMMERCIAL

## 2022-01-20 VITALS — DIASTOLIC BLOOD PRESSURE: 70 MMHG | SYSTOLIC BLOOD PRESSURE: 118 MMHG | HEART RATE: 77 BPM

## 2022-01-20 DIAGNOSIS — I10 ESSENTIAL HYPERTENSION WITH GOAL BLOOD PRESSURE LESS THAN 140/90: Primary | ICD-10-CM

## 2022-01-20 PROCEDURE — 99207 PR NO CHARGE NURSE ONLY: CPT

## 2022-01-20 NOTE — PROGRESS NOTES
I met with Isaura Mcmahan at the request of Kristen Kehr PA-C to recheck her blood pressure.  Blood pressure medications on the med list were reviewed with patient.    Patient has taken all medications as per usual regimen: Yes  Patient reports tolerating them without any issues or concerns: Yes    Vitals:    01/20/22 0926 01/20/22 0942   BP: (!) 150/78 118/70   Pulse: 77        After 5 minutes, the patient's blood pressure was <140/90, the previous encounter was reviewed, recorded blood pressure below 140/90. Patient was discharged and the note will be sent to the provider for final review.    Batool Sharp MA

## 2022-07-02 DIAGNOSIS — E87.6 DIURETIC-INDUCED HYPOKALEMIA: ICD-10-CM

## 2022-07-02 DIAGNOSIS — I10 ESSENTIAL HYPERTENSION WITH GOAL BLOOD PRESSURE LESS THAN 140/90: ICD-10-CM

## 2022-07-02 DIAGNOSIS — E78.5 HYPERLIPIDEMIA LDL GOAL <130: ICD-10-CM

## 2022-07-02 DIAGNOSIS — T50.2X5A DIURETIC-INDUCED HYPOKALEMIA: ICD-10-CM

## 2022-07-02 DIAGNOSIS — R73.03 PREDIABETES: Primary | ICD-10-CM

## 2022-07-02 NOTE — LETTER
July 6, 2022    Isaura WRIGHT [facility]  [address] [place] [address]    Dear Isaura,       We recently received a refill request for hydrochlorothiazide (HYDRODIURIL) 25 MG tablet, simvastatin (ZOCOR) 20 MG tablet, potassium chloride ER (K-TAB/KLOR-CON) 10 MEQ CR tablet.  We have refilled this for a one time 30 day supply only because you are due for a:    Medication  office visit and pre visit fasting lab appointment in the next 30 days.      Please schedule this lab appointment 4-5 days prior to the office visit.     Please call at your earliest convenience so that there will not be a delay with your future refills.          Thank you,   Your [facility] Team/  [phone]

## 2022-07-05 NOTE — TELEPHONE ENCOUNTER
Routing refill request to provider for review/approval because:  Drug interaction warning  Due for office visit in November.         Iris Dill RN

## 2022-07-06 RX ORDER — SIMVASTATIN 20 MG
TABLET ORAL
Qty: 30 TABLET | Refills: 0 | Status: SHIPPED | OUTPATIENT
Start: 2022-07-06 | End: 2022-08-15

## 2022-07-06 RX ORDER — HYDROCHLOROTHIAZIDE 25 MG/1
TABLET ORAL
Qty: 30 TABLET | Refills: 0 | Status: SHIPPED | OUTPATIENT
Start: 2022-07-06 | End: 2022-08-17

## 2022-07-06 RX ORDER — POTASSIUM CHLORIDE 750 MG/1
TABLET, EXTENDED RELEASE ORAL
Qty: 30 TABLET | Refills: 0 | Status: SHIPPED | OUTPATIENT
Start: 2022-07-06 | End: 2022-08-17

## 2022-07-06 NOTE — TELEPHONE ENCOUNTER
Please contact patient, she is due for an office visit and fasting labs within the next 30 days.   Kristen Kehr PA-C

## 2022-08-07 DIAGNOSIS — I10 ESSENTIAL HYPERTENSION WITH GOAL BLOOD PRESSURE LESS THAN 140/90: ICD-10-CM

## 2022-08-08 RX ORDER — AMLODIPINE BESYLATE 2.5 MG/1
TABLET ORAL
Qty: 90 TABLET | Refills: 0 | OUTPATIENT
Start: 2022-08-08

## 2022-08-08 NOTE — TELEPHONE ENCOUNTER
Patient was sent a letter last month and has not scheduled an appointment.   She will need an office visit for refills of medications.   Fasting lab orders are also in place  Kristen Kehr PA-C

## 2022-08-15 ENCOUNTER — MYC REFILL (OUTPATIENT)
Dept: FAMILY MEDICINE | Facility: CLINIC | Age: 57
End: 2022-08-15

## 2022-08-15 DIAGNOSIS — E78.5 HYPERLIPIDEMIA LDL GOAL <130: ICD-10-CM

## 2022-08-16 DIAGNOSIS — E78.5 HYPERLIPIDEMIA LDL GOAL <130: ICD-10-CM

## 2022-08-16 DIAGNOSIS — I10 ESSENTIAL HYPERTENSION WITH GOAL BLOOD PRESSURE LESS THAN 140/90: ICD-10-CM

## 2022-08-16 DIAGNOSIS — T50.2X5A DIURETIC-INDUCED HYPOKALEMIA: ICD-10-CM

## 2022-08-16 DIAGNOSIS — E87.6 DIURETIC-INDUCED HYPOKALEMIA: ICD-10-CM

## 2022-08-16 RX ORDER — SIMVASTATIN 20 MG
20 TABLET ORAL AT BEDTIME
Qty: 30 TABLET | Refills: 0 | Status: SHIPPED | OUTPATIENT
Start: 2022-08-16 | End: 2022-08-17

## 2022-08-17 RX ORDER — SIMVASTATIN 20 MG
TABLET ORAL
Qty: 90 TABLET | Refills: 0 | Status: SHIPPED | OUTPATIENT
Start: 2022-08-17 | End: 2022-09-08

## 2022-08-17 RX ORDER — POTASSIUM CHLORIDE 750 MG/1
TABLET, EXTENDED RELEASE ORAL
Qty: 90 TABLET | Refills: 0 | Status: SHIPPED | OUTPATIENT
Start: 2022-08-17 | End: 2022-09-08

## 2022-08-17 RX ORDER — HYDROCHLOROTHIAZIDE 25 MG/1
TABLET ORAL
Qty: 90 TABLET | Refills: 0 | Status: SHIPPED | OUTPATIENT
Start: 2022-08-17 | End: 2022-09-08

## 2022-08-27 ENCOUNTER — HEALTH MAINTENANCE LETTER (OUTPATIENT)
Age: 57
End: 2022-08-27

## 2022-08-28 ENCOUNTER — LAB (OUTPATIENT)
Dept: LAB | Facility: CLINIC | Age: 57
End: 2022-08-28
Payer: COMMERCIAL

## 2022-08-28 DIAGNOSIS — E78.5 HYPERLIPIDEMIA LDL GOAL <130: ICD-10-CM

## 2022-08-28 DIAGNOSIS — R73.03 PREDIABETES: ICD-10-CM

## 2022-08-28 DIAGNOSIS — I10 ESSENTIAL HYPERTENSION WITH GOAL BLOOD PRESSURE LESS THAN 140/90: ICD-10-CM

## 2022-08-28 LAB — HBA1C MFR BLD: 5.8 % (ref 0–5.6)

## 2022-08-28 PROCEDURE — 83036 HEMOGLOBIN GLYCOSYLATED A1C: CPT

## 2022-08-28 PROCEDURE — 80061 LIPID PANEL: CPT

## 2022-08-28 PROCEDURE — 36415 COLL VENOUS BLD VENIPUNCTURE: CPT

## 2022-08-28 PROCEDURE — 80048 BASIC METABOLIC PNL TOTAL CA: CPT

## 2022-08-29 LAB
ANION GAP SERPL CALCULATED.3IONS-SCNC: 8 MMOL/L (ref 3–14)
BUN SERPL-MCNC: 14 MG/DL (ref 7–30)
CALCIUM SERPL-MCNC: 9 MG/DL (ref 8.5–10.1)
CHLORIDE BLD-SCNC: 107 MMOL/L (ref 94–109)
CHOLEST SERPL-MCNC: 209 MG/DL
CO2 SERPL-SCNC: 26 MMOL/L (ref 20–32)
CREAT SERPL-MCNC: 0.69 MG/DL (ref 0.52–1.04)
FASTING STATUS PATIENT QL REPORTED: YES
GFR SERPL CREATININE-BSD FRML MDRD: >90 ML/MIN/1.73M2
GLUCOSE BLD-MCNC: 122 MG/DL (ref 70–99)
HDLC SERPL-MCNC: 56 MG/DL
LDLC SERPL CALC-MCNC: 109 MG/DL
NONHDLC SERPL-MCNC: 153 MG/DL
POTASSIUM BLD-SCNC: 3.4 MMOL/L (ref 3.4–5.3)
SODIUM SERPL-SCNC: 141 MMOL/L (ref 133–144)
TRIGL SERPL-MCNC: 221 MG/DL

## 2022-09-08 ENCOUNTER — OFFICE VISIT (OUTPATIENT)
Dept: FAMILY MEDICINE | Facility: CLINIC | Age: 57
End: 2022-09-08
Payer: COMMERCIAL

## 2022-09-08 VITALS
WEIGHT: 272 LBS | HEART RATE: 76 BPM | TEMPERATURE: 96.9 F | HEIGHT: 67 IN | BODY MASS INDEX: 42.69 KG/M2 | SYSTOLIC BLOOD PRESSURE: 132 MMHG | DIASTOLIC BLOOD PRESSURE: 82 MMHG | OXYGEN SATURATION: 94 %

## 2022-09-08 DIAGNOSIS — Z00.00 ROUTINE GENERAL MEDICAL EXAMINATION AT A HEALTH CARE FACILITY: Primary | ICD-10-CM

## 2022-09-08 DIAGNOSIS — T50.2X5A DIURETIC-INDUCED HYPOKALEMIA: ICD-10-CM

## 2022-09-08 DIAGNOSIS — I10 ESSENTIAL HYPERTENSION WITH GOAL BLOOD PRESSURE LESS THAN 140/90: ICD-10-CM

## 2022-09-08 DIAGNOSIS — E66.01 MORBID OBESITY (H): ICD-10-CM

## 2022-09-08 DIAGNOSIS — E87.6 DIURETIC-INDUCED HYPOKALEMIA: ICD-10-CM

## 2022-09-08 DIAGNOSIS — R73.03 PREDIABETES: ICD-10-CM

## 2022-09-08 DIAGNOSIS — E78.5 HYPERLIPIDEMIA LDL GOAL <130: ICD-10-CM

## 2022-09-08 PROCEDURE — 99214 OFFICE O/P EST MOD 30 MIN: CPT | Mod: 25 | Performed by: PHYSICIAN ASSISTANT

## 2022-09-08 PROCEDURE — 90682 RIV4 VACC RECOMBINANT DNA IM: CPT | Performed by: PHYSICIAN ASSISTANT

## 2022-09-08 PROCEDURE — 90471 IMMUNIZATION ADMIN: CPT | Performed by: PHYSICIAN ASSISTANT

## 2022-09-08 PROCEDURE — 99396 PREV VISIT EST AGE 40-64: CPT | Mod: 25 | Performed by: PHYSICIAN ASSISTANT

## 2022-09-08 RX ORDER — POTASSIUM CHLORIDE 750 MG/1
TABLET, EXTENDED RELEASE ORAL
Qty: 90 TABLET | Refills: 3 | Status: SHIPPED | OUTPATIENT
Start: 2022-09-08 | End: 2023-11-15

## 2022-09-08 RX ORDER — HYDROCHLOROTHIAZIDE 25 MG/1
25 TABLET ORAL DAILY
Qty: 90 TABLET | Refills: 3 | Status: SHIPPED | OUTPATIENT
Start: 2022-09-08 | End: 2023-11-15

## 2022-09-08 RX ORDER — SIMVASTATIN 20 MG
20 TABLET ORAL AT BEDTIME
Qty: 90 TABLET | Refills: 3 | Status: SHIPPED | OUTPATIENT
Start: 2022-09-08 | End: 2023-09-18

## 2022-09-08 RX ORDER — AMLODIPINE BESYLATE 2.5 MG/1
2.5 TABLET ORAL DAILY
Qty: 90 TABLET | Refills: 3 | Status: SHIPPED | OUTPATIENT
Start: 2022-09-08 | End: 2023-09-11

## 2022-09-08 ASSESSMENT — PAIN SCALES - GENERAL: PAINLEVEL: NO PAIN (0)

## 2022-09-08 NOTE — NURSING NOTE
"Chief Complaint   Patient presents with     Physical       Initial BP (!) 147/79   Pulse 76   Temp 96.9  F (36.1  C) (Tympanic)   Ht 1.689 m (5' 6.5\")   Wt 123.4 kg (272 lb)   SpO2 94%   BMI 43.24 kg/m   Estimated body mass index is 43.24 kg/m  as calculated from the following:    Height as of this encounter: 1.689 m (5' 6.5\").    Weight as of this encounter: 123.4 kg (272 lb).  Medication Reconciliation: complete    RICHA Newton MA    "

## 2022-09-08 NOTE — PROGRESS NOTES
SUBJECTIVE:   CC: Isaura Mcmahan is an 57 year old woman who presents for preventive health visit.       Patient has been advised of split billing requirements and indicates understanding: Yes  Healthy Habits:     Getting at least 3 servings of Calcium per day:  NO    Bi-annual eye exam:  Yes    Dental care twice a year:  Yes    Sleep apnea or symptoms of sleep apnea:  None    Diet:  Regular (no restrictions)    Frequency of exercise:  None    Taking medications regularly:  No    Medication side effects:  None    PHQ-2 Total Score: 0  History of Present Illness       Hypertension: She presents for follow up of hypertension.  She does not check blood pressure  regularly outside of the clinic. Outside blood pressures have been over 140/90. She does not follow a low salt diet.        PROBLEMS TO ADD:  1. Hypertension: managed with amlodipine and hydrochlorothiazide.   She is also taking potassium due to the hydrochlorothiazide   2. Hyperlipidemia: managed with simvastatin 20 mg daily  3. Prediabetes: she lost 60 pounds a few years ago, but gained it back. Starting to work on making healthy changes and losing weight again.       Today's PHQ-2 Score:   PHQ-2 ( 1999 [employer]) 9/8/2022   Q1: Little interest or pleasure in doing things 0   Q2: Feeling down, depressed or hopeless 0   PHQ-2 Score 0   PHQ-2 Total Score (12-17 Years)- Positive if 3 or more points; Administer PHQ-A if positive -   Q1: Little interest or pleasure in doing things Not at all   Q2: Feeling down, depressed or hopeless Not at all   PHQ-2 Score 0       Abuse: Current or Past (Physical, Sexual or Emotional) - No  Do you feel safe in your environment? Yes        Social History     Tobacco Use     Smoking status: Never Smoker     Smokeless tobacco: Never Used     Tobacco comment: per chart   Substance Use Topics     Alcohol use: Yes     Comment: occassional     If you drink alcohol do you typically have >3 drinks per day or >7 drinks per week?  No    No flowsheet data found.    Reviewed orders with patient.  Reviewed health maintenance and updated orders accordingly - Yes  BP Readings from Last 3 Encounters:   09/08/22 132/82   01/20/22 118/70   07/08/21 126/67    Wt Readings from Last 3 Encounters:   09/08/22 123.4 kg (272 lb)   07/08/21 94.3 kg (208 lb)   05/28/20 117.5 kg (259 lb)                  Patient Active Problem List   Diagnosis     Hyperlipidemia LDL goal <130     Hypertension goal BP (blood pressure) < 140/90     Obesity     CARON (obstructive sleep apnea)- mild (AHI 10)     Morbid obesity (H)     Past Surgical History:   Procedure Laterality Date     COLONOSCOPY       COLONOSCOPY WITH CO2 INSUFFLATION N/A 5/19/2016    Procedure: COLONOSCOPY WITH CO2 INSUFFLATION;  Surgeon: Segundo Crane MD;  Location: [facility]     DRAIN PILONIDAL CYST SIMPL  04/2004    left wrist cyst removal     LAPAROSCOPIC TUBAL LIGATION         Social History     Tobacco Use     Smoking status: Never Smoker     Smokeless tobacco: Never Used     Tobacco comment: per chart   Substance Use Topics     Alcohol use: Yes     Comment: occassional     Family History   Problem Relation Age of Onset     Hypertension Mother      Hyperlipidemia Mother      Other Cancer Mother         Lung and Brain     Cancer Father         prostate     Prostate Cancer Father      Other Cancer Father         bone     Diabetes Maternal Grandmother      Cancer Brother         testicular     Other Cancer Brother         testicular/testicular     Hypertension Brother      Hyperlipidemia Brother      Prostate Cancer Brother          Current Outpatient Medications   Medication Sig Dispense Refill     amLODIPine (NORVASC) 2.5 MG tablet Take 1 tablet (2.5 mg) by mouth daily 90 tablet 3     hydrochlorothiazide (HYDRODIURIL) 25 MG tablet Take 1 tablet (25 mg) by mouth daily 90 tablet 3     potassium chloride ER (K-TAB/KLOR-CON) 10 MEQ CR tablet TAKE 1 TABLET(10 MEQ) BY MOUTH DAILY 90 tablet 3      simvastatin (ZOCOR) 20 MG tablet Take 1 tablet (20 mg) by mouth At Bedtime 90 tablet 3     Allergies   Allergen Reactions     Amoxicillin Hives     Recent Labs   Lab Test 08/28/22  0943 07/02/21  0819 01/10/21  0903   A1C 5.8* 5.5 5.6   * 104* 123*   HDL 56 55 58   TRIG 221* 70 96   CR 0.69 0.83 0.86   GFRESTIMATED >90 79 76   GFRESTBLACK  --  >90 88   POTASSIUM 3.4 3.8 3.5        Breast Cancer Screening:    Breast CA Risk Assessment (FHS-7) 7/8/2021   Do you have a family history of breast, colon, or ovarian cancer? No / Unknown         Mammogram Screening: Recommended mammography every 1-2 years with patient discussion and risk factor consideration  Pertinent mammograms are reviewed under the imaging tab.    History of abnormal Pap smear:   NO - age 30-65 PAP every 5 years with negative HPV co-testing recommended  Last 3 Pap and HPV Results:   PAP / HPV Latest Ref Rng & Units 8/6/2019 11/10/2016 5/8/2013   PAP (Historical) - NIL NIL NIL   HPV16 NEG:Negative Negative Negative -   HPV18 NEG:Negative Negative Negative -   HRHPV NEG:Negative Negative Negative -     PAP / HPV Latest Ref Rng & Units 8/6/2019 11/10/2016 5/8/2013   PAP (Historical) - NIL NIL NIL   HPV16 NEG:Negative Negative Negative -   HPV18 NEG:Negative Negative Negative -   HRHPV NEG:Negative Negative Negative -     Reviewed and updated as needed this visit by clinical staff   Tobacco  Allergies  Meds   Med Hx  Surg Hx  Fam Hx  Soc Hx          Reviewed and updated as needed this visit by Provider                   Past Medical History:   Diagnosis Date     ASCUS on Pap smear     2011, neg HPV     Cancer (H)      Hypertension       Past Surgical History:   Procedure Laterality Date     COLONOSCOPY       COLONOSCOPY WITH CO2 INSUFFLATION N/A 5/19/2016    Procedure: COLONOSCOPY WITH CO2 INSUFFLATION;  Surgeon: Segundo Crane MD;  Location: [facility]     DRAIN PILONIDAL CYST SIMPL  04/2004    left wrist cyst removal     LAPAROSCOPIC  "TUBAL LIGATION       OB History    Para Term  AB Living   3 2 2 0 1 2   SAB IAB Ectopic Multiple Live Births   1 0 0 0 2      # Outcome Date GA Lbr Oniel/2nd Weight Sex Delivery Anes PTL Lv   3 Term 01 40w0d       MERA   2 SAB            1 Term 94 40w0d       MERA       Review of Systems       OBJECTIVE:   /82   Pulse 76   Temp 96.9  F (36.1  C) (Tympanic)   Ht 1.689 m (5' 6.5\")   Wt 123.4 kg (272 lb)   SpO2 94%   BMI 43.24 kg/m    Physical Exam  GENERAL APPEARANCE: healthy, alert and no distress  EYES: Eyes grossly normal to inspection, PERRL and conjunctivae and sclerae normal  HENT: ear canals and TM's normal, nose and mouth without ulcers or lesions, oropharynx clear and oral mucous membranes moist  NECK: no adenopathy, no asymmetry, masses, or scars and thyroid normal to palpation  RESP: lungs clear to auscultation - no rales, rhonchi or wheezes  BREAST: normal without masses, tenderness or nipple discharge and no palpable axillary masses or adenopathy  CV: regular rate and rhythm, normal S1 S2, no S3 or S4, no murmur, click or rub, no peripheral edema and peripheral pulses strong  ABDOMEN: soft, nontender, no hepatosplenomegaly, no masses and bowel sounds normal  MS: no musculoskeletal defects are noted and gait is age appropriate without ataxia  SKIN: no suspicious lesions or rashes  NEURO: Normal strength and tone, sensory exam grossly normal, mentation intact and speech normal  PSYCH: mentation appears normal and affect normal/bright    Diagnostic Test Results:  Labs reviewed in Epic    ASSESSMENT/PLAN:   (Z00.00) Routine general medical examination at a health care facility  (primary encounter diagnosis)  Comment: Health maintenance reviewed and updated.    (E66.01) Morbid obesity (H)  Comment: work on making lifestyle changes. She lost 60 pounds a few years ago and aware of changes to make.     (I10) Essential hypertension with goal blood pressure less than " "140/90  Comment: stable, refills given  Plan: amLODIPine (NORVASC) 2.5 MG tablet,         hydrochlorothiazide (HYDRODIURIL) 25 MG tablet            (E87.6,  T50.2X5A) Diuretic-induced hypokalemia  Comment: stable  BMP checked / results reviewed.   Plan: potassium chloride ER (K-TAB/KLOR-CON) 10 MEQ         CR tablet            (E78.5) Hyperlipidemia LDL goal <130  Comment: stable, elevated triglycerides, she will work on lifestyle changes.   Plan: simvastatin (ZOCOR) 20 MG tablet          Prediabetes  Reviewed fasting tests.   She is planning to make healthy changes with diet and exercise.     Patient has been advised of split billing requirements and indicates understanding: Yes    COUNSELING:  Reviewed preventive health counseling, as reflected in patient instructions       Regular exercise       Healthy diet/nutrition    Estimated body mass index is 43.24 kg/m  as calculated from the following:    Height as of this encounter: 1.689 m (5' 6.5\").    Weight as of this encounter: 123.4 kg (272 lb).    Weight management plan: Discussed healthy diet and exercise guidelines    She reports that she has never smoked. She has never used smokeless tobacco.      Counseling Resources:  ATP IV Guidelines  Pooled Cohorts Equation Calculator  Breast Cancer Risk Calculator  BRCA-Related Cancer Risk Assessment: FHS-7 Tool  FRAX Risk Assessment  ICSI Preventive Guidelines  Dietary Guidelines for Americans, 2010  USDA's MyPlate  ASA Prophylaxis  Lung CA Screening    Kristen M. Kehr, PA-C [facility]  "

## 2022-09-17 ENCOUNTER — ANCILLARY PROCEDURE (OUTPATIENT)
Dept: GENERAL RADIOLOGY | Facility: CLINIC | Age: 57
End: 2022-09-17
Attending: PHYSICIAN ASSISTANT
Payer: COMMERCIAL

## 2022-09-17 ENCOUNTER — OFFICE VISIT (OUTPATIENT)
Dept: URGENT CARE | Facility: URGENT CARE | Age: 57
End: 2022-09-17
Payer: COMMERCIAL

## 2022-09-17 VITALS
OXYGEN SATURATION: 97 % | BODY MASS INDEX: 43.98 KG/M2 | DIASTOLIC BLOOD PRESSURE: 84 MMHG | HEART RATE: 62 BPM | TEMPERATURE: 98.4 F | WEIGHT: 276.6 LBS | SYSTOLIC BLOOD PRESSURE: 162 MMHG

## 2022-09-17 DIAGNOSIS — M79.632 PAIN OF LEFT FOREARM: ICD-10-CM

## 2022-09-17 DIAGNOSIS — S50.12XA CONTUSION OF ELBOW AND FOREARM, LEFT, INITIAL ENCOUNTER: ICD-10-CM

## 2022-09-17 DIAGNOSIS — M25.522 PAIN IN LEFT ELBOW: ICD-10-CM

## 2022-09-17 DIAGNOSIS — M79.632 PAIN OF LEFT FOREARM: Primary | ICD-10-CM

## 2022-09-17 DIAGNOSIS — R07.81 RIB PAIN ON LEFT SIDE: ICD-10-CM

## 2022-09-17 DIAGNOSIS — S22.32XA CLOSED FRACTURE OF ONE RIB OF LEFT SIDE, INITIAL ENCOUNTER: ICD-10-CM

## 2022-09-17 PROCEDURE — 99213 OFFICE O/P EST LOW 20 MIN: CPT | Performed by: PHYSICIAN ASSISTANT

## 2022-09-17 PROCEDURE — 71101 X-RAY EXAM UNILAT RIBS/CHEST: CPT | Mod: TC | Performed by: RADIOLOGY
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
## 2022-09-17 NOTE — PATIENT INSTRUCTIONS
Take Tylenol or an NSAID such as ibuprofen or naproxen as needed for pain.  May take up to 1000 mg of Tylenol every 6-8 hours- do not exceed 4000 mg in 24 hours.  May take up to 600 mg ibuprofen every 6-8 hours.  Alternate Tylenol and Ibuprofen every 4 hours.    Rest-activity as tolerated  Ice your injury for 10 to 15 minutes 3-4 times a day  Elevate, above the level of her heart, whenever possible  Tylenol and ibuprofen as needed for pain

## 2022-10-20 ENCOUNTER — ANCILLARY PROCEDURE (OUTPATIENT)
Dept: MAMMOGRAPHY | Facility: CLINIC | Age: 57
End: 2022-10-20
Attending: PHYSICIAN ASSISTANT
Payer: COMMERCIAL

## 2022-10-20 DIAGNOSIS — Z12.31 VISIT FOR SCREENING MAMMOGRAM: ICD-10-CM

## 2022-10-20 PROCEDURE — 77067 SCR MAMMO BI INCL CAD: CPT | Mod: TC | Performed by: STUDENT IN AN ORGANIZED HEALTH CARE EDUCATION/TRAINING PROGRAM

## 2022-10-20 PROCEDURE — 77063 BREAST TOMOSYNTHESIS BI: CPT | Mod: TC | Performed by: STUDENT IN AN ORGANIZED HEALTH CARE EDUCATION/TRAINING PROGRAM

## 2022-11-22 ENCOUNTER — IMMUNIZATION (OUTPATIENT)
Dept: NURSING | Facility: CLINIC | Age: 57
End: 2022-11-22
Payer: COMMERCIAL

## 2022-11-22 PROCEDURE — 91312 COVID-19,PF,PFIZER BOOSTER BIVALENT: CPT

## 2022-11-22 PROCEDURE — 0124A COVID-19,PF,PFIZER BOOSTER BIVALENT: CPT

## 2023-01-17 ENCOUNTER — OFFICE VISIT (OUTPATIENT)
Dept: FAMILY MEDICINE | Facility: CLINIC | Age: 58
End: 2023-01-17
Payer: COMMERCIAL

## 2023-01-17 VITALS
DIASTOLIC BLOOD PRESSURE: 82 MMHG | OXYGEN SATURATION: 98 % | BODY MASS INDEX: 43.47 KG/M2 | RESPIRATION RATE: 20 BRPM | HEIGHT: 67 IN | HEART RATE: 72 BPM | WEIGHT: 277 LBS | SYSTOLIC BLOOD PRESSURE: 134 MMHG | TEMPERATURE: 97.6 F

## 2023-01-17 DIAGNOSIS — S59.912S FOREARM INJURY, LEFT, SEQUELA: Primary | ICD-10-CM

## 2023-01-17 DIAGNOSIS — E66.01 MORBID OBESITY (H): ICD-10-CM

## 2023-01-17 DIAGNOSIS — M21.611 BUNION, RIGHT: ICD-10-CM

## 2023-01-17 PROCEDURE — 99213 OFFICE O/P EST LOW 20 MIN: CPT | Performed by: PHYSICIAN ASSISTANT

## 2023-01-17 ASSESSMENT — PAIN SCALES - GENERAL: PAINLEVEL: NO PAIN (0)

## 2023-01-17 NOTE — NURSING NOTE
"Chief Complaint   Patient presents with     Blister     Right 2nd toe and left possible 3rd toe     Arm Injury     Follow up left elbow/forearm        Initial BP (!) 152/84   Pulse 72   Temp 97.6  F (36.4  C) (Tympanic)   Resp 20   Ht 1.689 m (5' 6.5\")   Wt 125.6 kg (277 lb)   SpO2 98%   BMI 44.04 kg/m   Estimated body mass index is 44.04 kg/m  as calculated from the following:    Height as of this encounter: 1.689 m (5' 6.5\").    Weight as of this encounter: 125.6 kg (277 lb).  Medication Reconciliation: complete    RICHA Newton MA    "
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
## 2023-03-01 ENCOUNTER — MYC MEDICAL ADVICE (OUTPATIENT)
Dept: FAMILY MEDICINE | Facility: CLINIC | Age: 58
End: 2023-03-01
Payer: COMMERCIAL

## 2023-03-10 ENCOUNTER — MYC MEDICAL ADVICE (OUTPATIENT)
Dept: FAMILY MEDICINE | Facility: CLINIC | Age: 58
End: 2023-03-10

## 2023-03-13 ENCOUNTER — OFFICE VISIT (OUTPATIENT)
Dept: URGENT CARE | Facility: URGENT CARE | Age: 58
End: 2023-03-13
Payer: COMMERCIAL

## 2023-03-13 VITALS
WEIGHT: 262 LBS | OXYGEN SATURATION: 99 % | RESPIRATION RATE: 14 BRPM | HEART RATE: 58 BPM | SYSTOLIC BLOOD PRESSURE: 150 MMHG | BODY MASS INDEX: 41.65 KG/M2 | DIASTOLIC BLOOD PRESSURE: 76 MMHG | TEMPERATURE: 97.1 F

## 2023-03-13 DIAGNOSIS — E66.01 MORBID OBESITY (H): ICD-10-CM

## 2023-03-13 DIAGNOSIS — R19.7 DIARRHEA, UNSPECIFIED TYPE: Primary | ICD-10-CM

## 2023-03-13 DIAGNOSIS — R63.4 WEIGHT LOSS: ICD-10-CM

## 2023-03-13 PROCEDURE — 99214 OFFICE O/P EST MOD 30 MIN: CPT | Performed by: FAMILY MEDICINE

## 2023-03-13 NOTE — PROGRESS NOTES
ASSESSMENT/PLAN:      ICD-10-CM    1. Diarrhea, unspecified type  R19.7     pt on livea wt loss program - 4 weeks,now for 13 days diarrhea after pm meal 1-3 stools, eats a protein, vegetable, shakes/bar for am /lunch,? due to supplement      2. Weight loss  R63.4     intentional, [employer] weight loss program       3. Morbid obesity (H)  E66.01     BMI 41           Patient Instructions   Stop digestive enzymes for 1 week, if diarrhea does not improve, stop probiotics for one week    If diarrhea persists, keep appointment with PCP as scheduled in 2 weeks    If fever, blood in stool, black stools to ER     If diarrhea worsens-episodes during the day-return to clinic         Reviewed medication instructions and side effects. Follow up if experiences side effects.     I reviewed supportive care, otc meds to use if needed, expected course, and signs of concern.  Follow up as needed or if she does not improve within  1-2 days or if worsens in any way.  Reviewed red flag symptoms and is to go to the ER if experiences any of these.     The use of Dragon/"[employer] dictation services may have been used to construct the content in this note; any grammatical or spelling errors are non-intentional. Please contact the author of this note directly if you are in need of any clarification.      On the day of the encounter, time spend on chart review, patient visit, review of testing, documentation was 30  minutes                                Patient presents with:  Diarrhea: Sx 13+days of ongoing. Diarrhea is mostly at night. Pt mentions she is doing Latrice about a month and a half ago. The meals are small throughout the day and one full meal @ 5 pm and a bedtime snack before bedtime. Per pt she does still have her gallbladder, but  denies any previous issues.        Subjective     Isaura Mcmahan is a 57 year old female who presents to clinic today for the following health issues:    HPI     Patient started [employer] diet 4 weeks  ago, for last 13 days, diarrehea, loose, some watery, strong odor, no blood in stool, no fever, no abdominal pain  No nausea/vomiting or diarrhea   No other family members or coworkers  with diarrhea    Diet consists of a [employer] bar and shake in am, bar and shake for lunch, bar for snack and 64+ oz of water, pm dinner a protein and vegetable  Loosing weight on diet prior to onset of diarrhea -per chart review 15 pounds since last weight on record in chart- 277 lb on 1/17/2023-today 262 lb  Typically diarrhea occurs after dinner-1-3 stools total     No hx of constipation, no hx of lactose intolerance, no high fructose corn syrup in supplements-shakes/bars      Take probiotics, digestive enzymes per the [employer] diet    Prior to diet taking 2 fish oil pills, 2 MVI daily, stopped 6 months prior to starting diet           Past Medical History:   Diagnosis Date     ASCUS on Pap smear     2011, neg HPV     Cancer (H)      Hypertension      Social History     Tobacco Use     Smoking status: Never     Smokeless tobacco: Never     Tobacco comments:     per chart   Vaping Use     Vaping status: Never Used   Substance Use Topics     Alcohol use: Yes     Comment: occassional       Current Outpatient Medications   Medication Sig Dispense Refill     amLODIPine (NORVASC) 2.5 MG tablet Take 1 tablet (2.5 mg) by mouth daily 90 tablet 3     hydrochlorothiazide (HYDRODIURIL) 25 MG tablet Take 1 tablet (25 mg) by mouth daily 90 tablet 3     potassium chloride ER (K-TAB/KLOR-CON) 10 MEQ CR tablet TAKE 1 TABLET(10 MEQ) BY MOUTH DAILY 90 tablet 3     simvastatin (ZOCOR) 20 MG tablet Take 1 tablet (20 mg) by mouth At Bedtime 90 tablet 3     Allergies   Allergen Reactions     Amoxicillin Hives             ROS are negative, except as otherwise noted HPI      Objective    BP (!) 150/76   Pulse 58   Temp 97.1  F (36.2  C) (Tympanic)   Resp 14   Wt 118.8 kg (262 lb)   SpO2 99%   BMI 41.65 kg/m    Body mass index is 41.65 kg/m .  Physical Exam    GENERAL: alert and no distress  NECK: no adenopathy, no asymmetry,  and thyroid normal to palpation  RESP: lungs clear to auscultation - no rales, rhonchi or wheezes  CV: regular rate and rhythm,  no murmur, click or rub,  ABDOMEN: soft, nontender, no hepatosplenomegaly, no masses and bowel sounds normal, no CVAT   MS: no gross musculoskeletal defects noted, no edema  SKIN: no suspicious lesions or rashes  NEURO: Normal strength and tone, mentation intact and speech normal, normal gait       Diagnostic Test Results:  Labs reviewed in Epic  No results found for any visits on 03/13/23.

## 2023-03-14 NOTE — PATIENT INSTRUCTIONS
Stop digestive enzymes for 1 week, if diarrhea does not improve, stop probiotics for one week    If diarrhea persists, keep appointment with PCP as scheduled in 2 weeks    If fever, blood in stool, black stools to ER     If diarrhea worsens-episodes during the day-return to clinic

## 2023-05-02 ENCOUNTER — OFFICE VISIT (OUTPATIENT)
Dept: FAMILY MEDICINE | Facility: CLINIC | Age: 58
End: 2023-05-02
Payer: COMMERCIAL

## 2023-05-02 VITALS
OXYGEN SATURATION: 100 % | TEMPERATURE: 97.7 F | DIASTOLIC BLOOD PRESSURE: 74 MMHG | SYSTOLIC BLOOD PRESSURE: 128 MMHG | RESPIRATION RATE: 16 BRPM | HEIGHT: 67 IN | WEIGHT: 242 LBS | BODY MASS INDEX: 37.98 KG/M2 | HEART RATE: 56 BPM

## 2023-05-02 DIAGNOSIS — T50.2X5A DIURETIC-INDUCED HYPOKALEMIA: ICD-10-CM

## 2023-05-02 DIAGNOSIS — E66.01 MORBID OBESITY (H): ICD-10-CM

## 2023-05-02 DIAGNOSIS — E78.5 HYPERLIPIDEMIA LDL GOAL <130: ICD-10-CM

## 2023-05-02 DIAGNOSIS — I10 ESSENTIAL HYPERTENSION WITH GOAL BLOOD PRESSURE LESS THAN 140/90: ICD-10-CM

## 2023-05-02 DIAGNOSIS — E87.6 DIURETIC-INDUCED HYPOKALEMIA: ICD-10-CM

## 2023-05-02 DIAGNOSIS — Z01.818 PREOP GENERAL PHYSICAL EXAM: Primary | ICD-10-CM

## 2023-05-02 DIAGNOSIS — M21.611 BUNION, RIGHT: ICD-10-CM

## 2023-05-02 LAB
ANION GAP SERPL CALCULATED.3IONS-SCNC: 5 MMOL/L (ref 3–14)
BUN SERPL-MCNC: 16 MG/DL (ref 7–30)
CALCIUM SERPL-MCNC: 9.9 MG/DL (ref 8.5–10.1)
CHLORIDE BLD-SCNC: 106 MMOL/L (ref 94–109)
CO2 SERPL-SCNC: 30 MMOL/L (ref 20–32)
CREAT SERPL-MCNC: 0.77 MG/DL (ref 0.52–1.04)
GFR SERPL CREATININE-BSD FRML MDRD: 89 ML/MIN/1.73M2
GLUCOSE BLD-MCNC: 102 MG/DL (ref 70–99)
HGB BLD-MCNC: 14.8 G/DL (ref 11.7–15.7)
POTASSIUM BLD-SCNC: 3.5 MMOL/L (ref 3.4–5.3)
SODIUM SERPL-SCNC: 141 MMOL/L (ref 133–144)

## 2023-05-02 PROCEDURE — 36415 COLL VENOUS BLD VENIPUNCTURE: CPT | Performed by: PHYSICIAN ASSISTANT

## 2023-05-02 PROCEDURE — 99214 OFFICE O/P EST MOD 30 MIN: CPT | Performed by: PHYSICIAN ASSISTANT

## 2023-05-02 PROCEDURE — 85018 HEMOGLOBIN: CPT | Performed by: PHYSICIAN ASSISTANT

## 2023-05-02 PROCEDURE — 80048 BASIC METABOLIC PNL TOTAL CA: CPT | Performed by: PHYSICIAN ASSISTANT

## 2023-05-02 ASSESSMENT — PAIN SCALES - GENERAL: PAINLEVEL: NO PAIN (0)

## 2023-05-02 NOTE — PATIENT INSTRUCTIONS
For informational purposes only. Not to replace the advice of your health care provider. Copyright   2003,  [employer] [employer] [facility]. All rights reserved. Clinically reviewed by Leeanna Puga MD. [facility] [id number] - REV .  Preparing for Your Surgery  Getting started  A nurse will call you to review your health history and instructions. They will give you an arrival time based on your scheduled surgery time. Please be ready to share:  Your doctor's clinic name and phone number  Your medical, surgical, and anesthesia history  A list of allergies and sensitivities  A list of medicines, including herbal treatments and over-the-counter drugs  Whether the patient has a legal guardian (ask how to send us the papers in advance)  Please tell us if you're pregnant--or if there's any chance you might be pregnant. Some surgeries may injure a fetus (unborn baby), so they require a pregnancy test. Surgeries that are safe for a fetus don't always need a test, and you can choose whether to have one.   If you have a child who's having surgery, please ask for a copy of Preparing for Your Child's Surgery.    Preparing for surgery  Within 10 to 30 days of surgery: Have a pre-op exam (sometimes called an H&P, or History and Physical). This can be done at a clinic or pre-operative center.  If you're having a , you may not need this exam. Talk to your care team.  At your pre-op exam, talk to your care team about all medicines you take. If you need to stop any medicines before surgery, ask when to start taking them again.  We do this for your safety. Many medicines can make you bleed too much during surgery. Some change how well surgery (anesthesia) drugs work.  Call your insurance company to let them know you're having surgery. (If you don't have insurance, call [phone].)  Call your clinic if there's any change in your health. This includes signs of a cold or flu (sore throat, runny nose, cough, rash, fever). It  also includes a scrape or scratch near the surgery site.  If you have questions on the day of surgery, call your hospital or surgery center.  Eating and drinking guidelines  For your safety: Unless your surgeon tells you otherwise, follow the guidelines below.  Eat and drink as usual until 8 hours before you arrive for surgery. After that, no food or milk.  Drink clear liquids until 2 hours before you arrive. These are liquids you can see through, like water, Gatorade, and Propel Water. They also include plain black coffee and tea (no cream or milk), candy, and breath mints. You can spit out gum when you arrive.  If you drink alcohol: Stop drinking it the night before surgery.  If your care team tells you to take medicine on the morning of surgery, it's okay to take it with a sip of water.  Preventing infection  Shower or bathe the night before and morning of your surgery. Follow the instructions your clinic gave you. (If no instructions, use regular soap.)  Don't shave or clip hair near your surgery site. We'll remove the hair if needed.  Don't smoke or vape the morning of surgery. You may chew nicotine gum up to 2 hours before surgery. A nicotine patch is okay.  Note: Some surgeries require you to completely quit smoking and nicotine. Check with your surgeon.  Your care team will make every effort to keep you safe from infection. We will:  Clean our hands often with soap and water (or an alcohol-based hand rub).  Clean the skin at your surgery site with a special soap that kills germs.  Give you a special gown to keep you warm. (Cold raises the risk of infection.)  Wear special hair covers, masks, gowns and gloves during surgery.  Give antibiotic medicine, if prescribed. Not all surgeries need antibiotics.  What to bring on the day of surgery  Photo ID and insurance card  Copy of your health care directive, if you have one  Glasses and hearing aids (bring cases)  You can't wear contacts during surgery  Inhaler and  eye drops, if you use them (tell us about these when you arrive)  CPAP machine or breathing device, if you use them  A few personal items, if spending the night  If you have . . .  A pacemaker, ICD (cardiac defibrillator) or other implant: Bring the ID card.  An implanted stimulator: Bring the remote control.  A legal guardian: Bring a copy of the certified (court-stamped) guardianship papers.  Please remove any jewelry, including body piercings. Leave jewelry and other valuables at home.  If you're going home the day of surgery  You must have a responsible adult drive you home. They should stay with you overnight as well.  If you don't have someone to stay with you, and you aren't safe to go home alone, we may keep you overnight. Insurance often won't pay for this.  After surgery  If it's hard to control your pain or you need more pain medicine, please call your surgeon's office.  Questions?   If you have any questions for your care team, list them here: _________________________________________________________________________________________________________________________________________________________________________ ____________________________________ ____________________________________ ____________________________________    How to Take Your Medication Before Surgery

## 2023-05-02 NOTE — PROGRESS NOTES
Preop Evaluation faxed to [facility] - Dr. Pro at [phone].  Carmina BERNARD    [facility]

## 2023-05-02 NOTE — PROGRESS NOTES
[facility]  [address] [address] [address]  Phone: [phone]  Primary Provider: Kehr, Kristen M  Pre-op Performing Provider: KEHR, KRISTEN M      PREOPERATIVE EVALUATION:  Today's date: 5/2/2023    Isaura Mcmahan is a 57 year old female who presents for a preoperative evaluation.      5/2/2023     8:04 AM   Additional Questions   Roomed by Denise     Surgical Information:  Surgery/Procedure: Bunion surgery   Surgery Location: [facility]   Surgeon: Dr. Pro  Surgery Date: 5/19/23  Time of Surgery: 7:00am   Where patient plans to recover: At home with family  Fax number for surgical facility:     Assessment & Plan     The proposed surgical procedure is considered INTERMEDIATE risk.    Preop general physical exam  Bunion, right  - BASIC METABOLIC PANEL; Future  - Hemoglobin; Future  - BASIC METABOLIC PANEL  - Hemoglobin      Hyperlipidemia LDL goal <130  Stable with the simvastatin, she will continue    Essential hypertension with goal blood pressure less than 140/90  Diuretic-induced hypokalemia  Blood pressure is well controlled.   She has been working on weight loss and doing well.     Morbid obesity (H)  She is working on weight loss with the [employer] program.   She is down 38 pounds.               - No identified additional risk factors other than previously addressed    Antiplatelet or Anticoagulation Medication Instructions:   - Patient is on no antiplatelet or anticoagulation medications.    Additional Medication Instructions:  Patient is to take all scheduled medications on the day of surgery    RECOMMENDATION:  APPROVAL GIVEN to proceed with proposed procedure, without further diagnostic evaluation.            Subjective     HPI related to upcoming procedure: Isaura has a bunion on her right foot causing pain. She will be having surgery.         5/1/2023     6:18 PM   Preop Questions   1. Have you ever had a heart attack or stroke? No   2. Have you ever had  surgery on your heart or blood vessels, such as a stent placement, a coronary artery bypass, or surgery on an artery in your head, neck, heart, or legs? No   3. Do you have chest pain with activity? No   4. Do you have a history of  heart failure? No   5. Do you currently have a cold, bronchitis or symptoms of other infection? No   6. Do you have a cough, shortness of breath, or wheezing? No   7. Do you or anyone in your family have previous history of blood clots? No   8. Do you or does anyone in your family have a serious bleeding problem such as prolonged bleeding following surgeries or cuts? No   9. Have you ever had problems with anemia or been told to take iron pills? No   10. Have you had any abnormal blood loss such as black, tarry or bloody stools, or abnormal vaginal bleeding? No   11. Have you ever had a blood transfusion? YES -    11a. Have you ever had a transfusion reaction? No   12. Are you willing to have a blood transfusion if it is medically needed before, during, or after your surgery? Yes   13. Have you or any of your relatives ever had problems with anesthesia? No   14. Do you have sleep apnea, excessive snoring or daytime drowsiness? YES -    14a. Do you have a CPAP machine? No   15. Do you have any artifical heart valves or other implanted medical devices like a pacemaker, defibrillator, or continuous glucose monitor? No   16. Do you have artificial joints? No   17. Are you allergic to latex? No   18. Is there any chance that you may be pregnant? No       Health Care Directive:  Patient does not have a Health Care Directive or Living Will: Patient states has Advance Directive and will bring in a copy to clinic.    Preoperative Review of :   reviewed - no record of controlled substances prescribed.      Status of Chronic Conditions:  HYPERLIPIDEMIA - Patient has a long history of significant Hyperlipidemia requiring medication for treatment with recent good control. Patient reports no  problems or side effects with the medication.     HYPERTENSION - Patient has longstanding history of HTN , currently denies any symptoms referable to elevated blood pressure. Specifically denies chest pain, palpitations, dyspnea, orthopnea, PND or peripheral edema. Blood pressure readings have been in normal range. Current medication regimen is as listed below. Patient denies any side effects of medication.       Review of Systems  Constitutional, neuro, ENT, endocrine, pulmonary, cardiac, gastrointestinal, genitourinary, musculoskeletal, integument and psychiatric systems are negative, except as otherwise noted.    Patient Active Problem List    Diagnosis Date Noted     Reagan right 01/17/2023     Priority: Medium     Morbid obesity (H) 09/08/2022     Priority: Medium     CARON (obstructive sleep apnea)- mild (AHI 10) 02/05/2016     Priority: Medium     Sleep study [facility] 7/29/2010 (235#)-AHI 5.6, RDI 10.4, ray oxygen 83%  Home Study Date: 12/10/16 (262#)-  Sustained hypoxemia was present. The lowest oxygen saturation was 79%. Apnea/Hypopnea Index of 10.7 events per hour  per  hour and 18.5 per hour supine, and n/a per hour left, and 4.6 per hour right         Hypertension goal BP (blood pressure) < 140/90 04/25/2013     Priority: Medium     Obesity 04/25/2013     Priority: Medium     Hyperlipidemia LDL goal <130 02/12/2012     Priority: Medium      Past Medical History:   Diagnosis Date     ASCUS on Pap smear     2011, neg HPV     Cancer (H)      History of blood transfusion      Hypertension      Past Surgical History:   Procedure Laterality Date     COLONOSCOPY       COLONOSCOPY WITH CO2 INSUFFLATION N/A 5/19/2016    Procedure: COLONOSCOPY WITH CO2 INSUFFLATION;  Surgeon: Segundo Crane MD;  Location: [facility] OR     DRAIN PILONIDAL CYST SIMPL  04/2004    left wrist cyst removal     LAPAROSCOPIC TUBAL LIGATION       Current Outpatient Medications   Medication Sig Dispense Refill     amLODIPine  "(NORVASC) 2.5 MG tablet Take 1 tablet (2.5 mg) by mouth daily 90 tablet 3     hydrochlorothiazide (HYDRODIURIL) 25 MG tablet Take 1 tablet (25 mg) by mouth daily 90 tablet 3     potassium chloride ER (K-TAB/KLOR-CON) 10 MEQ CR tablet TAKE 1 TABLET(10 MEQ) BY MOUTH DAILY 90 tablet 3     simvastatin (ZOCOR) 20 MG tablet Take 1 tablet (20 mg) by mouth At Bedtime 90 tablet 3       Allergies   Allergen Reactions     Amoxicillin Hives        Social History     Tobacco Use     Smoking status: Never     Smokeless tobacco: Never     Tobacco comments:     per chart   Vaping Use     Vaping status: Never Used   Substance Use Topics     Alcohol use: Not Currently     Comment: occassional     Family History   Problem Relation Age of Onset     Hypertension Mother      Hyperlipidemia Mother      Other Cancer Mother         Lung and Brain     Cancer Father         prostate     Prostate Cancer Father      Other Cancer Father         bone     Diabetes Maternal Grandmother      Cancer Brother         testicular     Other Cancer Brother         testicular/testicular     Hypertension Brother      Hyperlipidemia Brother      Prostate Cancer Brother      Other Cancer Brother         Testicular     History   Drug Use No         Objective     /74   Pulse 56   Temp 97.7  F (36.5  C) (Tympanic)   Resp 16   Ht 1.689 m (5' 6.5\")   Wt 109.8 kg (242 lb)   LMP  (LMP Unknown)   SpO2 100%   BMI 38.47 kg/m      Physical Exam    GENERAL APPEARANCE: healthy, alert and no distress     EYES: EOMI, PERRL     HENT: ear canals and TM's normal and nose and mouth without ulcers or lesions     NECK: no adenopathy, no asymmetry, masses, or scars and thyroid normal to palpation     RESP: lungs clear to auscultation - no rales, rhonchi or wheezes     CV: regular rates and rhythm, normal S1 S2, no S3 or S4 and no murmur, click or rub     ABDOMEN:  soft, nontender, no HSM or masses and bowel sounds normal     MS: extremities normal- no gross " deformities noted, no evidence of inflammation in joints, FROM in all extremities.     SKIN: no suspicious lesions or rashes     NEURO: Normal strength and tone, sensory exam grossly normal, mentation intact and speech normal     PSYCH: mentation appears normal. and affect normal/bright     LYMPHATICS: No cervical adenopathy    Recent Labs   Lab Test 08/28/22  0943 07/02/21  0819    142   POTASSIUM 3.4 3.8   CR 0.69 0.83   A1C 5.8* 5.5        Diagnostics:  Recent Results (from the past 24 hour(s))   BASIC METABOLIC PANEL    Collection Time: 05/02/23  8:41 AM   Result Value Ref Range    Sodium 141 133 - 144 mmol/L    Potassium 3.5 3.4 - 5.3 mmol/L    Chloride 106 94 - 109 mmol/L    Carbon Dioxide (CO2) 30 20 - 32 mmol/L    Anion Gap 5 3 - 14 mmol/L    Urea Nitrogen 16 7 - 30 mg/dL    Creatinine 0.77 0.52 - 1.04 mg/dL    Calcium 9.9 8.5 - 10.1 mg/dL    Glucose 102 (H) 70 - 99 mg/dL    GFR Estimate 89 >60 mL/min/1.73m2   Hemoglobin    Collection Time: 05/02/23  8:41 AM   Result Value Ref Range    Hemoglobin 14.8 11.7 - 15.7 g/dL      No EKG required, no history of coronary heart disease, significant arrhythmia, peripheral arterial disease or other structural heart disease.    Revised Cardiac Risk Index (RCRI):  The patient has the following serious cardiovascular risks for perioperative complications:   - No serious cardiac risks = 0 points     RCRI Interpretation: 1 point: Class II (low risk - 0.9% complication rate)           Signed Electronically by: Kristen M. Kehr, PA-C  Copy of this evaluation report is provided to requesting physician.

## 2023-05-03 ENCOUNTER — MYC MEDICAL ADVICE (OUTPATIENT)
Dept: FAMILY MEDICINE | Facility: CLINIC | Age: 58
End: 2023-05-03
Payer: COMMERCIAL

## 2023-07-26 ENCOUNTER — OFFICE VISIT (OUTPATIENT)
Dept: DERMATOLOGY | Facility: CLINIC | Age: 58
End: 2023-07-26
Payer: COMMERCIAL

## 2023-07-26 DIAGNOSIS — L73.8 SENILE SEBACEOUS GLAND HYPERPLASIA: ICD-10-CM

## 2023-07-26 DIAGNOSIS — D18.01 CHERRY ANGIOMA: ICD-10-CM

## 2023-07-26 DIAGNOSIS — L73.2 HIDRADENITIS SUPPURATIVA: Primary | ICD-10-CM

## 2023-07-26 DIAGNOSIS — D22.9 NEVUS SPILUS: ICD-10-CM

## 2023-07-26 DIAGNOSIS — L82.1 SEBORRHEIC KERATOSES: ICD-10-CM

## 2023-07-26 DIAGNOSIS — D22.9 MULTIPLE BENIGN NEVI: ICD-10-CM

## 2023-07-26 DIAGNOSIS — L81.4 LENTIGINES: ICD-10-CM

## 2023-07-26 PROCEDURE — 99214 OFFICE O/P EST MOD 30 MIN: CPT | Performed by: PHYSICIAN ASSISTANT

## 2023-07-26 RX ORDER — CLINDAMYCIN PHOSPHATE 10 UG/ML
LOTION TOPICAL 2 TIMES DAILY
Qty: 60 ML | Refills: 3 | Status: SHIPPED | OUTPATIENT
Start: 2023-07-26

## 2023-07-26 NOTE — NURSING NOTE
Isaura Mcmahan's goals for this visit include:   Chief Complaint   Patient presents with    Skin Check     Patient here for a skin check, areas of concern none       She requests these members of her care team be copied on today's visit information:     PCP: Kehr, Kristen M    Referring Provider:  No referring provider defined for this encounter.    LMP  (LMP Unknown)     Do you need any medication refills at today's visit?     Janene Hagan EMT

## 2023-07-26 NOTE — LETTER
7/26/2023         RE: Isaura Mcmahan  [address] [place] [address]        Dear Colleague,    Thank you for referring your patient, Isaura Mcmahan, to the [facility]. Please see a copy of my visit note below.    [facility] Dermatology Note  Encounter Date: Jul 26, 2023  Office Visit      Dermatology Problem List:  1. Hidradenitis suppurativa, Quiñonez stage 1  - Past t/x: Doxycycline 100 mg BID x3 months  - Current t/x: BPO wash, clindamycin 1% daily     Family History: Negative.  Social History: works as an .   ____________________________________________    Assessment & Plan:  # Sun damaged skin with solar lentigines. Chronic. Stable.   - Recommended sunscreens SPF #30 or greater, protective clothing and avoidance of tanning beds.      # Benign findings include: seborrheic keratoses, cherry angioma, sebaceous hyperplasia. Self limited, minor.   - No further intervention required. Patient to report changes.  - Patient reassured of the benign nature of these lesions.     # Multiple clinically benign nevi and nevus spilus (L thigh). Chronic. Stable.   - No further intervention required. Patient to report changes.  - Patient reassured of the benign nature of these lesions.     # Hidradenitis suppurativa, mild or Quiñonez stage 1. Well controlled with topicals currently.   - Continue BPO wash daily in the shower, clinda 1% lotion to spot treat any areas after showering. Refilled today    # Mucous Cyst - L 3rd toe  - asx for patient  - edu on etiology  - follow up with ortho for removal if symptomatic in future     Procedures Performed:   none    Follow-up: 2 year(s) in-person, or earlier for new or changing lesions    Staff:     All risks, benefits and alternatives were discussed with patient.  Patient is in agreement and understands the assessment and plan.  All questions were answered.    Radha Glynn PA-C, MPAS  [facility]  [facility]: Phone: [phone], Fax: [phone]  [facility]: Phone: [phone],  Fax: [phone]  [facility] [place] [facility]: Phone: [phone], Fax: [phone]  ____________________________________________    CC: Skin Check (Patient here for a skin check, areas of concern none)      HPI:  Ms. Isaura Mcmahan is a 58 year old female who presents today as a return patient for FBSE. Last sen by Dr. Lozano on 7/6/21. No hx skin cancer. Does have early HS.     Patient is otherwise feeling well, without additional concerns.    Labs:  none    Physical Exam:  Vitals: LMP  (LMP Unknown)   SKIN: Full skin, which includes the head/face, both arms, chest, back, abdomen,both legs, genitalia and/or groin buttocks, digits and/or nails, was examined.   - Alcantar's skin type II, <100 nevi  - There are dome shaped bright red papules on the trunk.   - Multiple regular brown pigmented macules and papules are identified on the trunk and extremities.   - Scattered brown macules on sun exposed areas.  - There are waxy stuck on tan to brown papules on the trunk.   - Scattered yellow oily papules with central umbilication on the face  - L anterior thigh faint brown patch with speckles appearance  - L 3rd toe - distal joint - 6mm pink papule, nontender  - No other lesions of concern on areas examined.     Medications:  Current Outpatient Medications   Medication     amLODIPine (NORVASC) 2.5 MG tablet     hydrochlorothiazide (HYDRODIURIL) 25 MG tablet     potassium chloride ER (K-TAB/KLOR-CON) 10 MEQ CR tablet     simvastatin (ZOCOR) 20 MG tablet     No current facility-administered medications for this visit.      Past Medical/Surgical History:   Patient Active Problem List   Diagnosis     Hyperlipidemia LDL goal <130     Hypertension goal BP (blood pressure) < 140/90     Obesity     CARON (obstructive sleep apnea)- mild (AHI 10)     Morbid obesity (H)      Bunion, right     Past Medical History:   Diagnosis Date     ASCUS on Pap smear     2011, neg HPV     Cancer (H)      History of blood transfusion      Hypertension                         Again, thank you for allowing me to participate in the care of your patient.        Sincerely,        Radha Glynn PA-C

## 2023-07-26 NOTE — PROGRESS NOTES
[facility] Dermatology Note  Encounter Date: Jul 26, 2023  Office Visit      Dermatology Problem List:  1. Hidradenitis suppurativa, Quiñonez stage 1  - Past t/x: Doxycycline 100 mg BID x3 months  - Current t/x: BPO wash, clindamycin 1% daily     Family History: Negative.  Social History: works as an .   ____________________________________________    Assessment & Plan:  # Sun damaged skin with solar lentigines. Chronic. Stable.   - Recommended sunscreens SPF #30 or greater, protective clothing and avoidance of tanning beds.      # Benign findings include: seborrheic keratoses, cherry angioma, sebaceous hyperplasia. Self limited, minor.   - No further intervention required. Patient to report changes.  - Patient reassured of the benign nature of these lesions.     # Multiple clinically benign nevi and nevus spilus (L thigh). Chronic. Stable.   - No further intervention required. Patient to report changes.  - Patient reassured of the benign nature of these lesions.     # Hidradenitis suppurativa, mild or Quiñonez stage 1. Well controlled with topicals currently.   - Continue BPO wash daily in the shower, clinda 1% lotion to spot treat any areas after showering. Refilled today    # Mucous Cyst - L 3rd toe  - asx for patient  - edu on etiology  - follow up with ortho for removal if symptomatic in future     Procedures Performed:   none    Follow-up: 2 year(s) in-person, or earlier for new or changing lesions    Staff:     All risks, benefits and alternatives were discussed with patient.  Patient is in agreement and understands the assessment and plan.  All questions were answered.    Radha Glynn PA-C, MPAS  [facility] [place]: Phone: [phone], Fax: [phone]  [facility]: Phone: [phone],  Fax: [phone]  [facility]: Phone: [phone], Fax:  [phone]  ____________________________________________    CC: Skin Check (Patient here for a skin check, areas of concern none)      HPI:  Ms. Isaura Mcmahan is a 58 year old female who presents today as a return patient for FBSE. Last sen by Dr. Lozano on 7/6/21. No hx skin cancer. Does have early HS.     Patient is otherwise feeling well, without additional concerns.    Labs:  none    Physical Exam:  Vitals: LMP  (LMP Unknown)   SKIN: Full skin, which includes the head/face, both arms, chest, back, abdomen,both legs, genitalia and/or groin buttocks, digits and/or nails, was examined.   - Alcantar's skin type II, <100 nevi  - There are dome shaped bright red papules on the trunk.   - Multiple regular brown pigmented macules and papules are identified on the trunk and extremities.   - Scattered brown macules on sun exposed areas.  - There are waxy stuck on tan to brown papules on the trunk.   - Scattered yellow oily papules with central umbilication on the face  - L anterior thigh faint brown patch with speckles appearance  - L 3rd toe - distal joint - 6mm pink papule, nontender  - No other lesions of concern on areas examined.     Medications:  Current Outpatient Medications   Medication    amLODIPine (NORVASC) 2.5 MG tablet    hydrochlorothiazide (HYDRODIURIL) 25 MG tablet    potassium chloride ER (K-TAB/KLOR-CON) 10 MEQ CR tablet    simvastatin (ZOCOR) 20 MG tablet     No current facility-administered medications for this visit.      Past Medical/Surgical History:   Patient Active Problem List   Diagnosis    Hyperlipidemia LDL goal <130    Hypertension goal BP (blood pressure) < 140/90    Obesity    CARON (obstructive sleep apnea)- mild (AHI 10)    Morbid obesity (H)    Bunion, right     Past Medical History:   Diagnosis Date    ASCUS on Pap smear     2011, neg HPV    Cancer (H)     History of blood transfusion     Hypertension

## 2023-08-01 ENCOUNTER — OFFICE VISIT (OUTPATIENT)
Dept: FAMILY MEDICINE | Facility: CLINIC | Age: 58
End: 2023-08-01
Payer: COMMERCIAL

## 2023-08-01 VITALS
TEMPERATURE: 98 F | HEIGHT: 67 IN | SYSTOLIC BLOOD PRESSURE: 130 MMHG | HEART RATE: 62 BPM | RESPIRATION RATE: 18 BRPM | OXYGEN SATURATION: 96 % | BODY MASS INDEX: 37.98 KG/M2 | WEIGHT: 242 LBS | DIASTOLIC BLOOD PRESSURE: 70 MMHG

## 2023-08-01 DIAGNOSIS — H69.92 DYSFUNCTION OF LEFT EUSTACHIAN TUBE: Primary | ICD-10-CM

## 2023-08-01 PROCEDURE — 99213 OFFICE O/P EST LOW 20 MIN: CPT | Performed by: PHYSICIAN ASSISTANT

## 2023-08-01 ASSESSMENT — PAIN SCALES - GENERAL: PAINLEVEL: NO PAIN (0)

## 2023-08-01 NOTE — NURSING NOTE
"Chief Complaint   Patient presents with    Ear Problem     Left ear       Initial BP (!) 143/76   Pulse 62   Temp 98  F (36.7  C) (Tympanic)   Resp 18   Ht 1.689 m (5' 6.5\")   Wt 109.8 kg (242 lb)   LMP  (LMP Unknown)   SpO2 96%   BMI 38.47 kg/m   Estimated body mass index is 38.47 kg/m  as calculated from the following:    Height as of this encounter: 1.689 m (5' 6.5\").    Weight as of this encounter: 109.8 kg (242 lb).  Medication Reconciliation: complete    RICHA Newton MA    "

## 2023-08-01 NOTE — PROGRESS NOTES
"  Assessment & Plan     Dysfunction of left eustachian tube  Start a daily over the counter antihistamine, Allegra, Claritin or Zyrtec   If no improvement in 2-3 weeks, then consider ENT appointment  - Adult ENT  Referral; Future             BMI:   Estimated body mass index is 38.47 kg/m  as calculated from the following:    Height as of this encounter: 1.689 m (5' 6.5\").    Weight as of this encounter: 109.8 kg (242 lb).   Weight management plan: not discussed        Kristen M. Kehr, PA-C [facility]   Isaura is a 58 year old, presenting for the following health issues:  Ear Problem (Left ear)      Isaura has had episodes of fluttering in her left ear. There is no pain associated. It will last for a few seconds and go away.   No chronic congestion or current URI symptoms.         8/1/2023    10:12 AM   Additional Questions   Roomed by Demetrius HOLT MA       History of Present Illness       Reason for visit:  Drumming sound in my left ear.  Comes and goes.  Symptom onset:  3-4 weeks ago  Symptoms include:  Fast drumming sound in my ear.  Symptom intensity:  Mild  Symptom progression:  Improving  Had these symptoms before:  No  What makes it worse:  Not sure  What makes it better:  Not sure    She eats 2-3 servings of fruits and vegetables daily.She consumes 0 sweetened beverage(s) daily.She exercises with enough effort to increase her heart rate 9 or less minutes per day.  She exercises with enough effort to increase her heart rate 3 or less days per week.   She is taking medications regularly.               Review of Systems   Constitutional, HEENT, cardiovascular, pulmonary, GI, , musculoskeletal, neuro, skin, endocrine and psych systems are negative, except as otherwise noted.      Objective    /70   Pulse 62   Temp 98  F (36.7  C) (Tympanic)   Resp 18   Ht 1.689 m (5' 6.5\")   Wt 109.8 kg (242 lb)   LMP  (LMP Unknown)   SpO2 96%   BMI 38.47 kg/m    Body mass " index is 38.47 kg/m .  Physical Exam   GENERAL: healthy, alert and no distress  EYES: Eyes grossly normal to inspection, PERRL and conjunctivae and sclerae normal  HENT: normal cephalic/atraumatic, right ear: normal: no effusions, no erythema, normal landmarks, left ear: canal is clear, the TM is dull / fluid present, no redness or bulging of TM, nose and mouth without ulcers or lesions, oropharynx clear, and oral mucous membranes moist  NECK: no adenopathy, no asymmetry, masses, or scars, and thyroid normal to palpation  SKIN: no suspicious lesions or rashes  NEURO: Normal strength and tone, mentation intact and speech normal  PSYCH: mentation appears normal, affect normal/bright

## 2023-08-09 ENCOUNTER — PATIENT OUTREACH (OUTPATIENT)
Dept: CARE COORDINATION | Facility: CLINIC | Age: 58
End: 2023-08-09
Payer: COMMERCIAL

## 2023-08-23 ENCOUNTER — PATIENT OUTREACH (OUTPATIENT)
Dept: CARE COORDINATION | Facility: CLINIC | Age: 58
End: 2023-08-23
Payer: COMMERCIAL

## 2023-09-11 DIAGNOSIS — I10 ESSENTIAL HYPERTENSION WITH GOAL BLOOD PRESSURE LESS THAN 140/90: ICD-10-CM

## 2023-09-11 RX ORDER — AMLODIPINE BESYLATE 2.5 MG/1
2.5 TABLET ORAL DAILY
Qty: 90 TABLET | Refills: 1 | Status: SHIPPED | OUTPATIENT
Start: 2023-09-11 | End: 2024-03-14

## 2023-09-16 DIAGNOSIS — E78.5 HYPERLIPIDEMIA LDL GOAL <130: ICD-10-CM

## 2023-09-18 RX ORDER — SIMVASTATIN 20 MG
20 TABLET ORAL AT BEDTIME
Qty: 90 TABLET | Refills: 3 | Status: SHIPPED | OUTPATIENT
Start: 2023-09-18 | End: 2024-05-01 | Stop reason: ALTCHOICE

## 2023-09-20 ENCOUNTER — PATIENT OUTREACH (OUTPATIENT)
Dept: CARE COORDINATION | Facility: CLINIC | Age: 58
End: 2023-09-20
Payer: COMMERCIAL

## 2023-10-18 ENCOUNTER — PATIENT OUTREACH (OUTPATIENT)
Dept: CARE COORDINATION | Facility: CLINIC | Age: 58
End: 2023-10-18
Payer: COMMERCIAL

## 2023-10-23 ENCOUNTER — ANCILLARY PROCEDURE (OUTPATIENT)
Dept: MAMMOGRAPHY | Facility: CLINIC | Age: 58
End: 2023-10-23
Attending: PHYSICIAN ASSISTANT
Payer: COMMERCIAL

## 2023-10-23 DIAGNOSIS — Z12.31 VISIT FOR SCREENING MAMMOGRAM: ICD-10-CM

## 2023-10-23 PROCEDURE — 77063 BREAST TOMOSYNTHESIS BI: CPT | Mod: TC | Performed by: RADIOLOGY

## 2023-10-23 PROCEDURE — 77067 SCR MAMMO BI INCL CAD: CPT | Mod: TC | Performed by: RADIOLOGY

## 2023-11-14 DIAGNOSIS — E87.6 DIURETIC-INDUCED HYPOKALEMIA: ICD-10-CM

## 2023-11-14 DIAGNOSIS — T50.2X5A DIURETIC-INDUCED HYPOKALEMIA: ICD-10-CM

## 2023-11-14 DIAGNOSIS — I10 ESSENTIAL HYPERTENSION WITH GOAL BLOOD PRESSURE LESS THAN 140/90: ICD-10-CM

## 2023-11-15 RX ORDER — POTASSIUM CHLORIDE 750 MG/1
TABLET, EXTENDED RELEASE ORAL
Qty: 90 TABLET | Refills: 2 | Status: SHIPPED | OUTPATIENT
Start: 2023-11-15 | End: 2024-05-01

## 2023-11-15 RX ORDER — HYDROCHLOROTHIAZIDE 25 MG/1
25 TABLET ORAL DAILY
Qty: 90 TABLET | Refills: 2 | Status: SHIPPED | OUTPATIENT
Start: 2023-11-15 | End: 2024-05-01

## 2023-12-02 ENCOUNTER — HEALTH MAINTENANCE LETTER (OUTPATIENT)
Age: 58
End: 2023-12-02

## 2024-01-03 ENCOUNTER — MYC MEDICAL ADVICE (OUTPATIENT)
Dept: FAMILY MEDICINE | Facility: CLINIC | Age: 59
End: 2024-01-03
Payer: COMMERCIAL

## 2024-02-01 ENCOUNTER — IMMUNIZATION (OUTPATIENT)
Dept: NURSING | Facility: CLINIC | Age: 59
End: 2024-02-01
Payer: COMMERCIAL

## 2024-02-01 PROCEDURE — 90686 IIV4 VACC NO PRSV 0.5 ML IM: CPT

## 2024-02-01 PROCEDURE — 90471 IMMUNIZATION ADMIN: CPT

## 2024-02-01 PROCEDURE — 91320 SARSCV2 VAC 30MCG TRS-SUC IM: CPT

## 2024-02-01 PROCEDURE — 90480 ADMN SARSCOV2 VAC 1/ONLY CMP: CPT

## 2024-03-14 DIAGNOSIS — I10 ESSENTIAL HYPERTENSION WITH GOAL BLOOD PRESSURE LESS THAN 140/90: ICD-10-CM

## 2024-03-14 RX ORDER — AMLODIPINE BESYLATE 2.5 MG/1
2.5 TABLET ORAL DAILY
Qty: 90 TABLET | Refills: 0 | Status: SHIPPED | OUTPATIENT
Start: 2024-03-14 | End: 2024-05-01

## 2024-05-01 ENCOUNTER — OFFICE VISIT (OUTPATIENT)
Dept: FAMILY MEDICINE | Facility: CLINIC | Age: 59
End: 2024-05-01
Payer: COMMERCIAL

## 2024-05-01 VITALS
WEIGHT: 271 LBS | OXYGEN SATURATION: 96 % | TEMPERATURE: 97.7 F | SYSTOLIC BLOOD PRESSURE: 136 MMHG | HEIGHT: 68 IN | BODY MASS INDEX: 41.07 KG/M2 | RESPIRATION RATE: 16 BRPM | HEART RATE: 62 BPM | DIASTOLIC BLOOD PRESSURE: 78 MMHG

## 2024-05-01 DIAGNOSIS — E66.01 MORBID OBESITY (H): ICD-10-CM

## 2024-05-01 DIAGNOSIS — E78.5 HYPERLIPIDEMIA LDL GOAL <130: ICD-10-CM

## 2024-05-01 DIAGNOSIS — Z12.4 CERVICAL CANCER SCREENING: ICD-10-CM

## 2024-05-01 DIAGNOSIS — Z00.00 ROUTINE GENERAL MEDICAL EXAMINATION AT A HEALTH CARE FACILITY: Primary | ICD-10-CM

## 2024-05-01 DIAGNOSIS — T75.3XXA MOTION SICKNESS, INITIAL ENCOUNTER: ICD-10-CM

## 2024-05-01 DIAGNOSIS — I10 HYPERTENSION GOAL BP (BLOOD PRESSURE) < 140/90: ICD-10-CM

## 2024-05-01 DIAGNOSIS — E87.6 DIURETIC-INDUCED HYPOKALEMIA: ICD-10-CM

## 2024-05-01 DIAGNOSIS — T50.2X5A DIURETIC-INDUCED HYPOKALEMIA: ICD-10-CM

## 2024-05-01 LAB — HBA1C MFR BLD: 5.9 % (ref 0–5.6)

## 2024-05-01 PROCEDURE — 83036 HEMOGLOBIN GLYCOSYLATED A1C: CPT | Performed by: PHYSICIAN ASSISTANT

## 2024-05-01 PROCEDURE — 99214 OFFICE O/P EST MOD 30 MIN: CPT | Mod: 25 | Performed by: PHYSICIAN ASSISTANT

## 2024-05-01 PROCEDURE — 87624 HPV HI-RISK TYP POOLED RSLT: CPT | Performed by: PHYSICIAN ASSISTANT

## 2024-05-01 PROCEDURE — 99396 PREV VISIT EST AGE 40-64: CPT | Performed by: PHYSICIAN ASSISTANT

## 2024-05-01 PROCEDURE — 80061 LIPID PANEL: CPT | Performed by: PHYSICIAN ASSISTANT

## 2024-05-01 PROCEDURE — 80048 BASIC METABOLIC PNL TOTAL CA: CPT | Performed by: PHYSICIAN ASSISTANT

## 2024-05-01 PROCEDURE — 36415 COLL VENOUS BLD VENIPUNCTURE: CPT | Performed by: PHYSICIAN ASSISTANT

## 2024-05-01 PROCEDURE — G0145 SCR C/V CYTO,THINLAYER,RESCR: HCPCS | Performed by: PHYSICIAN ASSISTANT

## 2024-05-01 RX ORDER — HYDROCHLOROTHIAZIDE 25 MG/1
25 TABLET ORAL DAILY
Qty: 90 TABLET | Refills: 3 | Status: SHIPPED | OUTPATIENT
Start: 2024-05-01

## 2024-05-01 RX ORDER — ATORVASTATIN CALCIUM 20 MG/1
20 TABLET, FILM COATED ORAL DAILY
Qty: 90 TABLET | Refills: 3 | Status: SHIPPED | OUTPATIENT
Start: 2024-05-01

## 2024-05-01 RX ORDER — POTASSIUM CHLORIDE 750 MG/1
TABLET, EXTENDED RELEASE ORAL
Qty: 90 TABLET | Refills: 3 | Status: SHIPPED | OUTPATIENT
Start: 2024-05-01

## 2024-05-01 RX ORDER — SCOLOPAMINE TRANSDERMAL SYSTEM 1 MG/1
1 PATCH, EXTENDED RELEASE TRANSDERMAL
Qty: 10 PATCH | Refills: 2 | Status: SHIPPED | OUTPATIENT
Start: 2024-05-01

## 2024-05-01 RX ORDER — AMLODIPINE BESYLATE 2.5 MG/1
2.5 TABLET ORAL DAILY
Qty: 90 TABLET | Refills: 3 | Status: SHIPPED | OUTPATIENT
Start: 2024-05-01

## 2024-05-01 SDOH — HEALTH STABILITY: PHYSICAL HEALTH: ON AVERAGE, HOW MANY MINUTES DO YOU ENGAGE IN EXERCISE AT THIS LEVEL?: 0 MIN

## 2024-05-01 SDOH — HEALTH STABILITY: PHYSICAL HEALTH: ON AVERAGE, HOW MANY DAYS PER WEEK DO YOU ENGAGE IN MODERATE TO STRENUOUS EXERCISE (LIKE A BRISK WALK)?: 0 DAYS

## 2024-05-01 ASSESSMENT — PAIN SCALES - GENERAL: PAINLEVEL: NO PAIN (0)

## 2024-05-01 ASSESSMENT — SOCIAL DETERMINANTS OF HEALTH (SDOH): HOW OFTEN DO YOU GET TOGETHER WITH FRIENDS OR RELATIVES?: ONCE A WEEK

## 2024-05-01 NOTE — PATIENT INSTRUCTIONS
Preventive Care Advice   This is general advice given by our system to help you stay healthy. However, your care team may have specific advice just for you. Please talk to your care team about your preventive care needs.  Nutrition  Eat 5 or more servings of fruits and vegetables each day.  Try wheat bread, brown rice and whole grain pasta (instead of white bread, rice, and pasta).  Get enough calcium and vitamin D. Check the label on foods and aim for 100% of the RDA (recommended daily allowance).  Lifestyle  Exercise at least 150 minutes each week   (30 minutes a day, 5 days a week).  Do muscle strengthening activities 2 days a week. These help control your weight and prevent disease.  No smoking.  Wear sunscreen to prevent skin cancer.  Have a dental exam and cleaning every 6 months.  Yearly exams  See your health care team every year to talk about:  Any changes in your health.  Any medicines your care team has prescribed.  Preventive care, family planning, and ways to prevent chronic diseases.  Shots (vaccines)   HPV shots (up to age 26), if you've never had them before.  Hepatitis B shots (up to age 59), if you've never had them before.  COVID-19 shot: Get this shot when it's due.  Flu shot: Get a flu shot every year.  Tetanus shot: Get a tetanus shot every 10 years.  Pneumococcal, hepatitis A, and RSV shots: Ask your care team if you need these based on your risk.  Shingles shot (for age 50 and up).  General health tests  Diabetes screening:  Starting at age 35, Get screened for diabetes at least every 3 years.  If you are younger than age 35, ask your care team if you should be screened for diabetes.  Cholesterol test: At age 39, start having a cholesterol test every 5 years, or more often if advised.  Bone density scan (DEXA): At age 50, ask your care team if you should have this scan for osteoporosis (brittle bones).  Hepatitis C: Get tested at least once in your life.  STIs (sexually transmitted  infections)  Before age 24: Ask your care team if you should be screened for STIs.  After age 24: Get screened for STIs if you're at risk. You are at risk for STIs (including HIV) if:  You are sexually active with more than one person.  You don't use condoms every time.  You or a partner was diagnosed with a sexually transmitted infection.  If you are at risk for HIV, ask about PrEP medicine to prevent HIV.  Get tested for HIV at least once in your life, whether you are at risk for HIV or not.  Cancer screening tests  Cervical cancer screening: If you have a cervix, begin getting regular cervical cancer screening tests at age 21. Most people who have regular screenings with normal results can stop after age 65. Talk about this with your provider.  Breast cancer scan (mammogram): If you've ever had breasts, begin having regular mammograms starting at age 40. This is a scan to check for breast cancer.  Colon cancer screening: It is important to start screening for colon cancer at age 45.  Have a colonoscopy test every 10 years (or more often if you're at risk) Or, ask your provider about stool tests like a FIT test every year or Cologuard test every 3 years.  To learn more about your testing options, visit: https://www.Enswers/738197.pdf.  For help making a decision, visit: https://bit.ly/ql30355.  Prostate cancer screening test: If you have a prostate and are age 55 to 69, ask your provider if you would benefit from a yearly prostate cancer screening test.  Lung cancer screening: If you are a current or former smoker age 50 to 80, ask your care team if ongoing lung cancer screenings are right for you.  For informational purposes only. Not to replace the advice of your health care provider. Copyright   2023 [employer]. All rights reserved. Clinically reviewed by the [facility]. [employer] 688051 - REV 01/24.    Preventing Falls: Care Instructions  Injuries and health  problems such as trouble walking or poor eyesight can increase your risk of falling. So can some medicines. But there are things you can do to help prevent falls. You can exercise to get stronger. You can also arrange your home to make it safer.    Talk to your doctor about the medicines you take. Ask if any of them increase the risk of falls and whether they can be changed or stopped.   Try to exercise regularly. It can help improve your strength and balance. This can help lower your risk of falling.     Practice fall safety and prevention.    Wear low-heeled shoes that fit well and give your feet good support. Talk to your doctor if you have foot problems that make this hard.  Carry a cellphone or wear a medical alert device that you can use to call for help.  Use stepladders instead of chairs to reach high objects. Don't climb if you're at risk for falls. Ask for help, if needed.  Wear the correct eyeglasses, if you need them.    Make your home safer.    Remove rugs, cords, clutter, and furniture from walkways.  Keep your house well lit. Use night-lights in hallways and bathrooms.  Install and use sturdy handrails on stairways.  Wear nonskid footwear, even inside. Don't walk barefoot or in socks without shoes.    Be safe outside.    Use handrails, curb cuts, and ramps whenever possible.  Keep your hands free by using a shoulder bag or backpack.  Try to walk in well-lit areas. Watch out for uneven ground, changes in pavement, and debris.  Be careful in the winter. Walk on the grass or gravel when sidewalks are slippery. Use de-icer on steps and walkways. Add non-slip devices to shoes.    Put grab bars and nonskid mats in your shower or tub and near the toilet. Try to use a shower chair or bath bench when bathing.   Get into a tub or shower by putting in your weaker leg first. Get out with your strong side first. Have a phone or medical alert device in the bathroom with you.   Where can you learn more?  Go to  "https://www.[employer].net/patiented  Enter G117 in the search box to learn more about \"Preventing Falls: Care Instructions.\"  Current as of: July 17, 2023               Content Version: 14.0    9575-9778 [employer].   Care instructions adapted under license by your healthcare professional. If you have questions about a medical condition or this instruction, always ask your healthcare professional. [employer] disclaims any warranty or liability for your use of this information.      "

## 2024-05-01 NOTE — PROGRESS NOTES
"Preventive Care Visit  [facility] Kristen M. Kehr, PA-C, Family Medicine  May 1, 2024      Assessment & Plan     Routine general medical examination at a health care facility  Health maintenance reviewed and updated.    Hyperlipidemia LDL goal <130  Reviewed lab tests from previous years, values have slowly gone up over the past year, she is going to change to atorvastatin 20 mg daily.   Work on lifestyle changes also.   - Lipid panel reflex to direct LDL Non-fasting; Future  - atorvastatin (LIPITOR) 20 MG tablet; Take 1 tablet (20 mg) by mouth daily  - Lipid panel reflex to direct LDL Non-fasting    Hypertension goal BP (blood pressure) < 140/90  Well controlled   Will continue with the amlodipine 2.5 mg and the hydrochlorothiazide daily.   Refills given  - BASIC METABOLIC PANEL; Future  - BASIC METABOLIC PANEL    Morbid obesity (H)  She has interest in working with Nutrition.   Discussed medications, she is not interested and would like to work on lifestyle changes for now.   - Hemoglobin A1c; Future  - Nutrition Referral; Future  - Hemoglobin A1c    Motion sickness, initial encounter  - scopolamine (TRANSDERM) 1 MG/3DAYS 72 hr patch; Place 1 patch onto the skin every 72 hours    Diuretic-induced hypokalemia  Continue her current dose of potassium and will check BMP today.   - potassium chloride ER (K-TAB/KLOR-CON) 10 MEQ CR tablet; TAKE 1 TABLET(10 MEQ) BY MOUTH DAILY    Cervical cancer screening  - Pap Screen with HPV - recommended age 30 - 65 years              BMI  Estimated body mass index is 41.82 kg/m  as calculated from the following:    Height as of this encounter: 1.715 m (5' 7.5\").    Weight as of this encounter: 122.9 kg (271 lb).   Weight management plan: Discussed healthy diet and exercise guidelines referral to Nutrition    Counseling  Appropriate preventive services were discussed with this patient, including applicable screening as appropriate for fall prevention, nutrition, " physical activity, Tobacco-use cessation, weight loss and cognition.  Checklist reviewing preventive services available has been given to the patient.  Reviewed patient's diet, addressing concerns and/or questions.           Cielo Delarosa is a 58 year old, presenting for the following:  Physical        5/1/2024     8:40 AM   Additional Questions   Roomed by Demetrius HOLT MA        Health Care Directive  Patient does not have a Health Care Directive or Living Will: Discussed advance care planning with patient; information given to patient to review.    LORNA Delarosa is here today for preventative appointment and follow up of chronic health conditions.     Hyperlipidemia: managed with simvastatin  Hypertension: managed with amlodipine and hydrochlorothiazide   Diuretic induced hypokalemia: she is taking daily potassium supplements 10 MEQ   Motion sickness: she went on a cruise this past year and had difficulty.  Obesity: she retired this past year, she found herself struggling with lack of routine schedule. She has gained 30 pounds and trying to get back on track with healthy habits.               5/1/2024   General Health   How would you rate your overall physical health? (!) FAIR   Feel stress (tense, anxious, or unable to sleep) Not at all         5/1/2024   Nutrition   Three or more servings of calcium each day? (!) NO   Diet: Regular (no restrictions)   How many servings of fruit and vegetables per day? (!) 0-1   How many sweetened beverages each day? 0-1         5/1/2024   Exercise   Days per week of moderate/strenous exercise 0 days   Average minutes spent exercising at this level 0 min   (!) EXERCISE CONCERN      5/1/2024   Social Factors   Frequency of gathering with friends or relatives Once a week   Worry food won't last until get money to buy more No   Food not last or not have enough money for food? No   Do you have housing?  Yes   Are you worried about losing your housing? No   Lack of transportation? No    Unable to get utilities (heat,electricity)? No         5/1/2024   Fall Risk   Fallen 2 or more times in the past year? No   Trouble with walking or balance? Yes   Gait Speed Test (Document in seconds) 3   Gait Speed Test Interpretation Less than or equal to 5.00 seconds - PASS          5/1/2024   Dental   Dentist two times every year? Yes         5/1/2024   TB Screening   Were you born outside of the [place]? No         Today's PHQ-2 Score:       5/1/2024     7:57 AM   PHQ-2 ( 1999 [employer])   Q1: Little interest or pleasure in doing things 0   Q2: Feeling down, depressed or hopeless 0   PHQ-2 Score 0   Q1: Little interest or pleasure in doing things Not at all   Q2: Feeling down, depressed or hopeless Not at all   PHQ-2 Score 0           5/1/2024   Substance Use   Alcohol more than 3/day or more than 7/wk Not Applicable   Do you use any other substances recreationally? No     Social History     Tobacco Use    Smoking status: Never    Smokeless tobacco: Never    Tobacco comments:     per chart   Vaping Use    Vaping status: Never Used   Substance Use Topics    Alcohol use: Not Currently     Comment: occassional    Drug use: No             5/1/2024   Breast Cancer Screening   Family history of breast, colon, or ovarian cancer? No / Unknown         10/23/2023   LAST FHS-7 RESULTS   1st degree relative breast or ovarian cancer No   Any relative bilateral breast cancer No   Any male have breast cancer No   Any ONE woman have BOTH breast AND ovarian cancer No   Any woman with breast cancer before 50yrs No   2 or more relatives with breast AND/OR ovarian cancer No   2 or more relatives with breast AND/OR bowel cancer No       Mammogram Screening - Mammogram every 1-2 years updated in Health Maintenance based on mutual decision making        5/1/2024   STI Screening   New sexual partner(s) since last STI/HIV test? No     History of abnormal Pap smear: NO - age 30-65 PAP every 5 years with negative HPV co-testing  recommended  Last 3 Pap and HPV Results:       Latest Ref Rng & Units 2019     7:52 AM 2019     7:45 AM 11/10/2016     9:00 AM   PAP / HPV   PAP (Historical)  NIL      HPV 16 DNA NEG^Negative  Negative  Negative    HPV 18 DNA NEG^Negative  Negative  Negative    Other HR HPV NEG^Negative  Negative  Negative            Latest Ref Rng & Units 2019     7:52 AM 2019     7:45 AM 11/10/2016     9:00 AM   PAP / HPV   PAP (Historical)  NIL      HPV 16 DNA NEG^Negative  Negative  Negative    HPV 18 DNA NEG^Negative  Negative  Negative    Other HR HPV NEG^Negative  Negative  Negative      ASCVD Risk   The 10-year ASCVD risk score (Edenilson EVANS, et al., 2019) is: 4.6%    Values used to calculate the score:      Age: 58 years      Sex: Female      Is Non- : No      Diabetic: No      Tobacco smoker: No      Systolic Blood Pressure: 136 mmHg      Is BP treated: Yes      HDL Cholesterol: 56 mg/dL      Total Cholesterol: 209 mg/dL        Reviewed and updated as needed this visit by Provider   Tobacco  Allergies  Meds  Problems  Med Hx  Surg Hx  Fam Hx            Past Medical History:   Diagnosis Date    ASCUS on Pap smear     , neg HPV    Cancer (H)     History of blood transfusion     Hypertension      Past Surgical History:   Procedure Laterality Date    COLONOSCOPY      COLONOSCOPY WITH CO2 INSUFFLATION N/A 2016    Procedure: COLONOSCOPY WITH CO2 INSUFFLATION;  Surgeon: Segundo Crane MD;  Location: [facility] OR    DRAIN PILONIDAL CYST SIMPL  2004    left wrist cyst removal    LAPAROSCOPIC TUBAL LIGATION       OB History    Para Term  AB Living   3 2 2 0 1 2   SAB IAB Ectopic Multiple Live Births   1 0 0 0 2      # Outcome Date GA Lbr Oniel/2nd Weight Sex Type Anes PTL Lv   3 Term 01 40w0d       MERA   2 SAB 2000           1 Term 94 40w0d       MERA     BP Readings from Last 3 Encounters:   24 136/78   23 130/70   23  128/74    Wt Readings from Last 3 Encounters:   05/01/24 122.9 kg (271 lb)   08/01/23 109.8 kg (242 lb)   05/02/23 109.8 kg (242 lb)                  Patient Active Problem List   Diagnosis    Hyperlipidemia LDL goal <130    Hypertension goal BP (blood pressure) < 140/90    Obesity    CARON (obstructive sleep apnea)- mild (AHI 10)    Morbid obesity (H)    Bunion, right     Past Surgical History:   Procedure Laterality Date    COLONOSCOPY      COLONOSCOPY WITH CO2 INSUFFLATION N/A 5/19/2016    Procedure: COLONOSCOPY WITH CO2 INSUFFLATION;  Surgeon: Segundo Crane MD;  Location: [facility] OR    DRAIN PILONIDAL CYST SIMPL  04/2004    left wrist cyst removal    LAPAROSCOPIC TUBAL LIGATION         Social History     Tobacco Use    Smoking status: Never    Smokeless tobacco: Never    Tobacco comments:     per chart   Substance Use Topics    Alcohol use: Not Currently     Comment: occassional     Family History   Problem Relation Age of Onset    Hypertension Mother     Hyperlipidemia Mother     Other Cancer Mother         Lung and Brain    Cancer Father         prostate    Prostate Cancer Father     Other Cancer Father         bone    Diabetes Maternal Grandmother     Cancer Brother         testicular    Other Cancer Brother         testicular/testicular    Hypertension Brother     Hyperlipidemia Brother     Prostate Cancer Brother     Other Cancer Brother         Testicular         Current Outpatient Medications   Medication Sig Dispense Refill    amLODIPine (NORVASC) 2.5 MG tablet Take 1 tablet (2.5 mg) by mouth daily 90 tablet 3    atorvastatin (LIPITOR) 20 MG tablet Take 1 tablet (20 mg) by mouth daily 90 tablet 3    benzoyl peroxide 5 % external liquid Use topically as body wash few times weekly for prevention 236 mL 3    clindamycin (CLEOCIN T) 1 % external lotion Apply topically 2 times daily 60 mL 3    hydrochlorothiazide (HYDRODIURIL) 25 MG tablet Take 1 tablet (25 mg) by mouth daily 90 tablet 3    potassium  "chloride ER (K-TAB/KLOR-CON) 10 MEQ CR tablet TAKE 1 TABLET(10 MEQ) BY MOUTH DAILY 90 tablet 3    scopolamine (TRANSDERM) 1 MG/3DAYS 72 hr patch Place 1 patch onto the skin every 72 hours 10 patch 2     Allergies   Allergen Reactions    Amoxicillin Hives     Recent Labs   Lab Test 05/01/24  0925 05/02/23  0841 08/28/22  0943 07/02/21  0819 01/10/21  0903   A1C 5.9*  --  5.8* 5.5 5.6   LDL  --   --  109* 104* 123*   HDL  --   --  56 55 58   TRIG  --   --  221* 70 96   CR  --  0.77 0.69 0.83 0.86   GFRESTIMATED  --  89 >90 79 76   GFRESTBLACK  --   --   --  >90 88   POTASSIUM  --  3.5 3.4 3.8 3.5          Review of Systems  Constitutional, HEENT, cardiovascular, pulmonary, GI, , musculoskeletal, neuro, skin, endocrine and psych systems are negative, except as otherwise noted.     Objective    Exam  /78   Pulse 62   Temp 97.7  F (36.5  C) (Tympanic)   Resp 16   Ht 1.715 m (5' 7.5\")   Wt 122.9 kg (271 lb)   LMP  (LMP Unknown)   SpO2 96%   Breastfeeding No   BMI 41.82 kg/m     Estimated body mass index is 41.82 kg/m  as calculated from the following:    Height as of this encounter: 1.715 m (5' 7.5\").    Weight as of this encounter: 122.9 kg (271 lb).    Physical Exam  GENERAL: alert and no distress  EYES: Eyes grossly normal to inspection, PERRL and conjunctivae and sclerae normal  HENT: ear canals and TM's normal, nose and mouth without ulcers or lesions  NECK: no adenopathy, no asymmetry, masses, or scars  RESP: lungs clear to auscultation - no rales, rhonchi or wheezes  BREAST: normal without masses, tenderness or nipple discharge and no palpable axillary masses or adenopathy  CV: regular rate and rhythm, normal S1 S2, no S3 or S4, no murmur, click or rub, no peripheral edema  ABDOMEN: soft, nontender, no hepatosplenomegaly, no masses and bowel sounds normal   (female) w/bimanual: normal female external genitalia, normal urethral meatus, normal vaginal mucosa, and normal cervix/adnexa/uterus without " masses or discharge  MS: no gross musculoskeletal defects noted, no edema  SKIN: no suspicious lesions or rashes  NEURO: Normal strength and tone, mentation intact and speech normal  PSYCH: mentation appears normal, affect normal/bright        Signed Electronically by: Kristen M. Kehr, PA-C

## 2024-05-02 LAB
ANION GAP SERPL CALCULATED.3IONS-SCNC: 12 MMOL/L (ref 7–15)
BUN SERPL-MCNC: 12.1 MG/DL (ref 8–23)
CALCIUM SERPL-MCNC: 9.8 MG/DL (ref 8.6–10)
CHLORIDE SERPL-SCNC: 104 MMOL/L (ref 98–107)
CHOLEST SERPL-MCNC: 198 MG/DL
CREAT SERPL-MCNC: 0.8 MG/DL (ref 0.51–0.95)
DEPRECATED HCO3 PLAS-SCNC: 27 MMOL/L (ref 22–29)
EGFRCR SERPLBLD CKD-EPI 2021: 84 ML/MIN/1.73M2
FASTING STATUS PATIENT QL REPORTED: YES
GLUCOSE SERPL-MCNC: 119 MG/DL (ref 70–99)
HDLC SERPL-MCNC: 65 MG/DL
LDLC SERPL CALC-MCNC: 100 MG/DL
NONHDLC SERPL-MCNC: 133 MG/DL
POTASSIUM SERPL-SCNC: 3.5 MMOL/L (ref 3.4–5.3)
SODIUM SERPL-SCNC: 143 MMOL/L (ref 135–145)
TRIGL SERPL-MCNC: 166 MG/DL

## 2024-05-06 LAB
BKR LAB AP GYN ADEQUACY: NORMAL
BKR LAB AP GYN INTERPRETATION: NORMAL
BKR LAB AP HPV REFLEX: NORMAL
BKR LAB AP PREVIOUS ABNORMAL: NORMAL
PATH REPORT.COMMENTS IMP SPEC: NORMAL
PATH REPORT.COMMENTS IMP SPEC: NORMAL
PATH REPORT.RELEVANT HX SPEC: NORMAL

## 2024-05-08 LAB
HUMAN PAPILLOMA VIRUS 16 DNA: NEGATIVE
HUMAN PAPILLOMA VIRUS 18 DNA: NEGATIVE
HUMAN PAPILLOMA VIRUS FINAL DIAGNOSIS: NORMAL
HUMAN PAPILLOMA VIRUS OTHER HR: NEGATIVE

## 2024-09-23 ENCOUNTER — PATIENT OUTREACH (OUTPATIENT)
Dept: CARE COORDINATION | Facility: CLINIC | Age: 59
End: 2024-09-23
Payer: COMMERCIAL

## 2024-10-21 ENCOUNTER — PATIENT OUTREACH (OUTPATIENT)
Dept: CARE COORDINATION | Facility: CLINIC | Age: 59
End: 2024-10-21
Payer: COMMERCIAL

## 2024-10-31 ENCOUNTER — ANCILLARY PROCEDURE (OUTPATIENT)
Dept: MAMMOGRAPHY | Facility: CLINIC | Age: 59
End: 2024-10-31
Attending: PHYSICIAN ASSISTANT
Payer: COMMERCIAL

## 2024-10-31 DIAGNOSIS — Z12.31 VISIT FOR SCREENING MAMMOGRAM: ICD-10-CM

## 2024-10-31 PROCEDURE — 77067 SCR MAMMO BI INCL CAD: CPT | Mod: GC | Performed by: RADIOLOGY

## 2024-10-31 PROCEDURE — 77063 BREAST TOMOSYNTHESIS BI: CPT | Mod: GC | Performed by: RADIOLOGY

## 2024-11-15 ENCOUNTER — IMMUNIZATION (OUTPATIENT)
Dept: FAMILY MEDICINE | Facility: CLINIC | Age: 59
End: 2024-11-15
Payer: COMMERCIAL

## 2024-11-15 DIAGNOSIS — Z23 ENCOUNTER FOR IMMUNIZATION: Primary | ICD-10-CM

## 2024-11-15 PROCEDURE — 99207 PR NO CHARGE NURSE ONLY: CPT

## 2024-11-15 PROCEDURE — 91320 SARSCV2 VAC 30MCG TRS-SUC IM: CPT

## 2024-11-15 PROCEDURE — 90471 IMMUNIZATION ADMIN: CPT

## 2024-11-15 PROCEDURE — 90673 RIV3 VACCINE NO PRESERV IM: CPT

## 2024-11-15 PROCEDURE — 90480 ADMN SARSCOV2 VAC 1/ONLY CMP: CPT

## 2024-11-15 NOTE — PROGRESS NOTES
Prior to immunization administration, verified patients identity using patient s name and date of birth. Please see Immunization Activity for additional information.     Is the patient's temperature normal (100.5 or less)? Yes     Patient MEETS CRITERIA. PROCEED with vaccine administration.      Patient instructed to remain in clinic for 15 minutes afterwards, and to report any adverse reactions.      Link to Ancillary Visit Immunization Standing Orders SmartSet     Screening performed by Raul Luis MA on 11/15/2024 at 9:31 AM.

## 2024-12-14 ENCOUNTER — OFFICE VISIT (OUTPATIENT)
Dept: URGENT CARE | Facility: URGENT CARE | Age: 59
End: 2024-12-14
Payer: COMMERCIAL

## 2024-12-14 VITALS
TEMPERATURE: 97.8 F | RESPIRATION RATE: 16 BRPM | HEART RATE: 64 BPM | SYSTOLIC BLOOD PRESSURE: 154 MMHG | DIASTOLIC BLOOD PRESSURE: 78 MMHG | WEIGHT: 228 LBS | OXYGEN SATURATION: 96 % | BODY MASS INDEX: 35.79 KG/M2 | HEIGHT: 67 IN

## 2024-12-14 DIAGNOSIS — T78.40XA ALLERGIC REACTION, INITIAL ENCOUNTER: ICD-10-CM

## 2024-12-14 DIAGNOSIS — H02.843 SWELLING OF EYELID, RIGHT: ICD-10-CM

## 2024-12-14 DIAGNOSIS — H10.31 ACUTE BACTERIAL CONJUNCTIVITIS OF RIGHT EYE: Primary | ICD-10-CM

## 2024-12-14 PROCEDURE — 99213 OFFICE O/P EST LOW 20 MIN: CPT | Performed by: NURSE PRACTITIONER

## 2024-12-14 RX ORDER — PREDNISONE 20 MG/1
40 TABLET ORAL DAILY
Qty: 6 TABLET | Refills: 0 | Status: SHIPPED | OUTPATIENT
Start: 2024-12-14 | End: 2024-12-17

## 2024-12-14 RX ORDER — POLYMYXIN B SULFATE AND TRIMETHOPRIM 1; 10000 MG/ML; [USP'U]/ML
1 SOLUTION OPHTHALMIC 4 TIMES DAILY
Qty: 10 ML | Refills: 0 | Status: SHIPPED | OUTPATIENT
Start: 2024-12-14 | End: 2024-12-21

## 2024-12-14 NOTE — PATIENT INSTRUCTIONS
Stop ciprofloxain drops  Oral antihistamine such as zyrtec daily  Cool compress to eyelid  Ibuprofen as needed  Fill prescription for prednisone daily for 3 days if eyelid swelling does not continue to improve by tomorrow

## 2024-12-14 NOTE — PROGRESS NOTES
Assessment & Plan     Acute bacterial conjunctivitis of right eye    - polymixin b-trimethoprim (POLYTRIM) 08913-3.1 UNIT/ML-% ophthalmic solution  Dispense: 10 mL; Refill: 0    Allergic reaction, initial encounter      Swelling of eyelid, right    - predniSONE (DELTASONE) 20 MG tablet  Dispense: 6 tablet; Refill: 0     Stop ciprofloxain drops  Oral antihistamine such as zyrtec daily  Cool compress to eyelid  Ibuprofen as needed  Fill prescription for prednisone daily for 3 days if eyelid swelling does not continue to improve by tomorrow    Go to ED if swelling worsens or you develop symptoms of systemic allergic reaction    Follow-up with PCP if symptoms persist for 4 days, and sooner if symptoms worsen or new symptoms develop.     Discussed red flag symptoms which warrant immediate visit in emergency room    All questions were answered and patient verbalized understanding. AVS reviewed with patient.     Hailee Rae, DNP, APRN, CNP 12/14/2024 11:22 AM  [facility] [place]    Cielo Delarosa is a 59 year old female who presents to clinic today for the following health issues:  Chief Complaint   Patient presents with    Allergic Reaction     [facility] pink eye right eye swollen, Possible to Ciprofloxacin Ophthalmic Solution.         Eye Problem    Onset of symptoms was 1 day(s) ago.   Location: right eye   Course of illness is improving    Current and Associated symptoms: woke up today with upper eyelid swelling. Had eye redness, purulent discharge, mattering, irriation yesterday which resolved  Denies fever, itching, pain, warmth, blurred vision, tongue or lip swelling, shortness of breath, wheezing, emesis, hives  Denies: Pink eye exposure, Contact lens use, and Eye injury   [facility] and prescribed ciprofloxacin eye drops which helped with the grittiness, mattering, purulent discharge. Took twice yesterday- none today  No history  "of diabetes  Nothing tried OTC      Problem list, Medication list, Allergies, and Medical history reviewed in EPIC.    ROS:  Review of systems negative except for noted above        Objective    BP (!) 154/78 (BP Location: Left arm, Patient Position: Sitting, Cuff Size: Adult Large)   Pulse 64   Temp 97.8  F (36.6  C) (Tympanic)   Resp 16   Ht 1.702 m (5' 7\")   Wt 103.4 kg (228 lb)   LMP  (LMP Unknown)   SpO2 96%   BMI 35.71 kg/m    Physical Exam  Constitutional:       General: She is not in acute distress.     Appearance: She is not toxic-appearing or diaphoretic.   HENT:      Head: Normocephalic and atraumatic.      Mouth/Throat:      Mouth: No angioedema.   Eyes:      General:         Right eye: No discharge or hordeolum.         Left eye: No discharge or hordeolum.      Extraocular Movements: Extraocular movements intact.      Right eye: Normal extraocular motion.      Left eye: Normal extraocular motion.      Conjunctiva/sclera:      Right eye: Right conjunctiva is injected.      Left eye: Left conjunctiva is not injected.      Pupils: Pupils are equal, round, and reactive to light.      Comments: Right upper eyelid moderate swelling with mild erythema, non tender with palpation   Cardiovascular:      Rate and Rhythm: Normal rate and regular rhythm.      Heart sounds: Normal heart sounds.   Pulmonary:      Effort: Pulmonary effort is normal. No respiratory distress.      Breath sounds: Normal breath sounds. No wheezing, rhonchi or rales.   Neurological:      Mental Status: She is alert.          "

## 2025-01-25 DIAGNOSIS — I10 HYPERTENSION GOAL BP (BLOOD PRESSURE) < 140/90: ICD-10-CM

## 2025-01-27 RX ORDER — HYDROCHLOROTHIAZIDE 25 MG/1
25 TABLET ORAL DAILY
Qty: 90 TABLET | Refills: 3 | OUTPATIENT
Start: 2025-01-27

## 2025-04-21 DIAGNOSIS — E87.6 DIURETIC-INDUCED HYPOKALEMIA: ICD-10-CM

## 2025-04-21 DIAGNOSIS — T50.2X5A DIURETIC-INDUCED HYPOKALEMIA: ICD-10-CM

## 2025-04-21 DIAGNOSIS — E78.5 HYPERLIPIDEMIA LDL GOAL <130: ICD-10-CM

## 2025-04-21 RX ORDER — ATORVASTATIN CALCIUM 20 MG/1
20 TABLET, FILM COATED ORAL DAILY
Qty: 90 TABLET | Refills: 0 | Status: SHIPPED | OUTPATIENT
Start: 2025-04-21

## 2025-04-21 RX ORDER — POTASSIUM CHLORIDE 750 MG/1
10 TABLET, EXTENDED RELEASE ORAL
Qty: 90 TABLET | Refills: 0 | Status: SHIPPED | OUTPATIENT
Start: 2025-04-21

## 2025-04-29 DIAGNOSIS — I10 HYPERTENSION GOAL BP (BLOOD PRESSURE) < 140/90: ICD-10-CM

## 2025-04-30 RX ORDER — AMLODIPINE BESYLATE 2.5 MG/1
2.5 TABLET ORAL DAILY
Qty: 90 TABLET | Refills: 0 | Status: SHIPPED | OUTPATIENT
Start: 2025-04-30

## 2025-05-06 ENCOUNTER — ANCILLARY PROCEDURE (OUTPATIENT)
Dept: GENERAL RADIOLOGY | Facility: CLINIC | Age: 60
End: 2025-05-06
Payer: COMMERCIAL

## 2025-05-06 ENCOUNTER — OFFICE VISIT (OUTPATIENT)
Dept: URGENT CARE | Facility: URGENT CARE | Age: 60
End: 2025-05-06
Payer: COMMERCIAL

## 2025-05-06 VITALS
BODY MASS INDEX: 35.71 KG/M2 | TEMPERATURE: 98.4 F | WEIGHT: 228 LBS | OXYGEN SATURATION: 95 % | SYSTOLIC BLOOD PRESSURE: 148 MMHG | HEART RATE: 63 BPM | RESPIRATION RATE: 14 BRPM | DIASTOLIC BLOOD PRESSURE: 81 MMHG

## 2025-05-06 DIAGNOSIS — R05.1 ACUTE COUGH: ICD-10-CM

## 2025-05-06 DIAGNOSIS — J20.9 ACUTE BRONCHITIS, UNSPECIFIED ORGANISM: Primary | ICD-10-CM

## 2025-05-06 PROCEDURE — 3079F DIAST BP 80-89 MM HG: CPT

## 2025-05-06 PROCEDURE — 99213 OFFICE O/P EST LOW 20 MIN: CPT

## 2025-05-06 PROCEDURE — 3077F SYST BP >= 140 MM HG: CPT

## 2025-05-06 PROCEDURE — 71046 X-RAY EXAM CHEST 2 VIEWS: CPT | Mod: TC | Performed by: STUDENT IN AN ORGANIZED HEALTH CARE EDUCATION/TRAINING PROGRAM

## 2025-05-06 RX ORDER — BENZONATATE 200 MG/1
200 CAPSULE ORAL 3 TIMES DAILY PRN
Qty: 21 CAPSULE | Refills: 0 | Status: SHIPPED | OUTPATIENT
Start: 2025-05-06

## 2025-05-06 RX ORDER — PREDNISONE 20 MG/1
20 TABLET ORAL DAILY
Qty: 5 TABLET | Refills: 0 | Status: SHIPPED | OUTPATIENT
Start: 2025-05-06 | End: 2025-05-11

## 2025-05-06 NOTE — PROGRESS NOTES
Urgent Care Clinic Visit    Chief Complaint   Patient presents with    URI     Cough for about a week  Sore throat  Had a runny nose this morning but not anymore   Has been sick off and on since December w/ similar sx               5/6/2025    10:36 AM   Additional Questions   Roomed by Celi   Accompanied by Self

## 2025-05-06 NOTE — PATIENT INSTRUCTIONS
Chest x-ray shows the following per the radiologist report: Probable minimal left basilar atelectasis. No focal consolidation, pleural effusion or pneumothorax. Cardiomediastinal silhouette is unremarkable.  Take the benzonatate and prednisone as prescribed.  In addition, given the atelectasis findings on the radiologist report; take deep breaths throughout the day to reexpand the lungs.  You can use nasal saline spray and/or humidifier/steam for symptoms.  Follow-up with your primary care provider should symptoms persist.

## 2025-05-06 NOTE — PROGRESS NOTES
ASSESSMENT:  (J20.9) Acute bronchitis, unspecified organism  (primary encounter diagnosis)  Plan: benzonatate (TESSALON) 200 MG capsule,         predniSONE (DELTASONE) 20 MG tablet    (R05.1) Acute cough  Plan: XR Chest 2 Views    PLAN:  Informed the patient that the chest x-ray shows the following per the radiologist report: Probable minimal left basilar atelectasis. No focal consolidation, pleural effusion or pneumothorax. Cardiomediastinal silhouette is unremarkable.  Bronchitis patient instructions discussed and provided.  Informed the patient to take the benzonatate and prednisone as prescribed.  We discussed taking deep breaths throughout the day to reexpand the lungs given the atelectasis finding on the chest x-ray per the radiologist report.  We also discussed using nasal saline spray and/or humidifier/steam for symptoms.  Informed the patient to follow-up with her primary care provider should symptoms persist.  Patient acknowledged understanding of the above plan.    The use of Dragon/[employer] services may have been used to construct the content in this note; any grammatical or spelling errors are non-intentional. Please contact the author of this note directly if you are in need of any clarification.      Nehemias Melo, ANA CNP      SUBJECTIVE:   Isaura Mcmahan is a 59 year old female presenting with a chief complaint of intermittent runny nose and cough - productive.  Onset of symptoms was over a week ago.  Course of illness is same.    Patient denies: ear pain, sore throat, vomiting, and diarrhea  Treatment measures tried include OTC Cough med.  Predisposing factors include None.    ROS:  Negative except noted above.    OBJECTIVE:  BP (!) 148/81   Pulse 63   Temp 98.4  F (36.9  C) (Oral)   Resp 14   Wt 103.4 kg (228 lb)   LMP  (LMP Unknown)   SpO2 95%   BMI 35.71 kg/m    GENERAL APPEARANCE: healthy, alert and no distress  EYES: EOMI,  PERRL, conjunctiva clear  HENT: ear  canals and TM's normal.  Nose and mouth without ulcers, erythema or lesions  NECK: supple, nontender, no lymphadenopathy  RESP: lungs clear to auscultation - no rales, rhonchi or wheezes  CV: regular rates and rhythm, normal S1 S2, no murmur noted  SKIN: no suspicious lesions or rashes    X-RAY: Chest x-ray shows the following per the radiologist report: Probable minimal left basilar atelectasis. No focal consolidation, pleural effusion or pneumothorax. Cardiomediastinal silhouette is unremarkable.

## 2025-05-07 ENCOUNTER — OFFICE VISIT (OUTPATIENT)
Dept: DERMATOLOGY | Facility: CLINIC | Age: 60
End: 2025-05-07
Payer: COMMERCIAL

## 2025-05-07 DIAGNOSIS — L82.0 SEBORRHEIC KERATOSIS, INFLAMED: ICD-10-CM

## 2025-05-07 DIAGNOSIS — D22.9 MULTIPLE NEVI: ICD-10-CM

## 2025-05-07 DIAGNOSIS — L73.2 HIDRADENITIS SUPPURATIVA: ICD-10-CM

## 2025-05-07 DIAGNOSIS — L82.1 SEBORRHEIC KERATOSIS: ICD-10-CM

## 2025-05-07 DIAGNOSIS — D18.01 CHERRY ANGIOMA: ICD-10-CM

## 2025-05-07 DIAGNOSIS — L81.4 LENTIGO: ICD-10-CM

## 2025-05-07 DIAGNOSIS — D23.9 DERMATOFIBROMA: ICD-10-CM

## 2025-05-07 DIAGNOSIS — L30.9 HAND DERMATITIS: Primary | ICD-10-CM

## 2025-05-07 DIAGNOSIS — Z12.83 SKIN CANCER SCREENING: ICD-10-CM

## 2025-05-07 PROCEDURE — 17110 DESTRUCTION B9 LES UP TO 14: CPT | Performed by: STUDENT IN AN ORGANIZED HEALTH CARE EDUCATION/TRAINING PROGRAM

## 2025-05-07 PROCEDURE — 99214 OFFICE O/P EST MOD 30 MIN: CPT | Mod: 25 | Performed by: STUDENT IN AN ORGANIZED HEALTH CARE EDUCATION/TRAINING PROGRAM

## 2025-05-07 PROCEDURE — 1126F AMNT PAIN NOTED NONE PRSNT: CPT | Performed by: STUDENT IN AN ORGANIZED HEALTH CARE EDUCATION/TRAINING PROGRAM

## 2025-05-07 RX ORDER — CLINDAMYCIN PHOSPHATE 10 UG/ML
LOTION TOPICAL 2 TIMES DAILY
Qty: 60 ML | Refills: 3 | Status: SHIPPED | OUTPATIENT
Start: 2025-05-07

## 2025-05-07 RX ORDER — CLOBETASOL PROPIONATE 0.5 MG/G
CREAM TOPICAL
Qty: 60 G | Refills: 1 | Status: SHIPPED | OUTPATIENT
Start: 2025-05-07

## 2025-05-07 ASSESSMENT — PAIN SCALES - GENERAL: PAINLEVEL_OUTOF10: NO PAIN (0)

## 2025-05-07 NOTE — PATIENT INSTRUCTIONS
Cryotherapy    What is it?  Use of a very cold liquid, such as liquid nitrogen, to freeze and destroy abnormal skin cells that need to be removed    What should I expect?  Tenderness and redness  A small blister that might grow and fill with dark purple blood. There may be crusting.  More than one treatment may be needed if the lesions do not go away.    How do I care for the treated area?  Gently wash the area with your hands when bathing.  Use a thin layer of Vaseline to help with healing. You may use a Band-Aid.   The area should heal within 7-10 days and may leave behind a pink or lighter color.   Do not use an antibiotic or Neosporin ointment.   You may take acetaminophen (Tylenol) for pain.     Call your doctor if you have:  Severe pain  Signs of infection (warmth, redness, cloudy yellow drainage, and or a bad smell)  Questions or concerns    Who should I call with questions?      [facility]: [phone]      [facility]: [phone]      For urgent needs outside of business hours call the [facility] at [phone] and ask for the dermatology resident on call     Patient Education       Proper skin care from [facility]:    -Eliminate harsh soaps as they strip the natural oils from the skin, often resulting in dry itchy skin ( i.e. Dial, Zest, Mignon Spring)  -Use mild soaps such as Cetaphil or Dove Sensitive Skin in the shower. You do not need to use soap on arms, legs, and trunk every time you shower unless visibly soiled.   -Avoid hot or cold showers.  -After showering, lightly dry off and apply moisturizing within 2-3 minutes. This will help trap moisture in the skin.   -Aggressive use of a moisturizer at least 1-2 times a day to the entire body (including -Vanicream, Cetaphil, Aquaphor or Cerave) and moisturize hands after every washing.  -We recommend using moisturizers that come in a tub that needs to be scooped out, not a  pump. This has more of an oil base. It will hold moisture in your skin much better than a water base moisturizer. The above recommended are non-pore clogging.      Wear a sunscreen with at least SPF 30 on your face, ears, neck and V of the chest daily. Wear sunscreen on other areas of the body if those areas are exposed to the sun throughout the day. Sunscreens can contain physical and/or chemical blockers. Physical blockers are less likely to clog pores, these include zinc oxide and titanium dioxide. Reapply every two hour and after swimming.     Sunscreen examples: https://www.ewg.org/sunscreen/    UV radiation  UVA radiation remains constant throughout the day and throughout the year. It is a longer wavelength than UVB and therefore penetrates deeper into the skin leading to immediate and delayed tanning, photoaging, and skin cancer. 70-80% of UVA and UVB radiation occurs between the hours of 10am-2pm.  UVB radiation  UVB radiation causes the most harmful effects and is more significant during the summer months. However, snow and ice can reflect UVB radiation leading to skin damage during the winter months as well. UVB radiation is responsible for tanning, burning, inflammation, delayed erythema (pinkness), pigmentation (brown spots), and skin cancer.     I recommend self monthly full body exams and yearly full body exams with a dermatology provider. If you develop a new or changing lesion please follow up for examination. Most skin cancers are pink and scaly or pink and pearly. However, we do see blue/brown/black skin cancers.  Consider the ABCDEs of melanoma when giving yourself your monthly full body exam ( don't forget the groin, buttocks, feet, toes, etc). A-asymmetry, B-borders, C-color, D-diameter, E-elevation or evolving. If you see any of these changes please follow up in clinic. If you cannot see your back I recommend purchasing a hand held mirror to use with a larger wall mirror.       Checking for  Skin Cancer  You can find cancer early by checking your skin each month. There are 3 kinds of skin cancer. They are melanoma, basal cell carcinoma, and squamous cell carcinoma. Doing monthly skin checks is the best way to find new marks or skin changes. Follow the instructions below for checking your skin.   The ABCDEs of checking moles for melanoma   Check your moles or growths for signs of melanoma using ABCDE:   Asymmetry: the sides of the mole or growth don t match  Border: the edges are ragged, notched, or blurred  Color: the color within the mole or growth varies  Diameter: the mole or growth is larger than 6 mm (size of a pencil eraser)  Evolving: the size, shape, or color of the mole or growth is changing (evolving is not shown in the images below)    Checking for other types of skin cancer  Basal cell carcinoma or squamous cell carcinoma have symptoms such as:     A spot or mole that looks different from all other marks on your skin  Changes in how an area feels, such as itching, tenderness, or pain  Changes in the skin's surface, such as oozing, bleeding, or scaliness  A sore that does not heal  New swelling or redness beyond the border of a mole    Who s at risk?  Anyone can get skin cancer. But you are at greater risk if you have:   Fair skin, light-colored hair, or light-colored eyes  Many moles or abnormal moles on your skin  A history of sunburns from sunlight or tanning beds  A family history of skin cancer  A history of exposure to radiation or chemicals  A weakened immune system  If you have had skin cancer in the past, you are at risk for recurring skin cancer.   How to check your skin  Do your monthly skin checkups in front of a full-length mirror. Check all parts of your body, including your:   Head (ears, face, neck, and scalp)  Torso (front, back, and sides)  Arms (tops, undersides, upper, and lower armpits)  Hands (palms, backs, and fingers, including under the nails)  Buttocks and  genitals  Legs (front, back, and sides)  Feet (tops, soles, toes, including under the nails, and between toes)  If you have a lot of moles, take digital photos of them each month. Make sure to take photos both up close and from a distance. These can help you see if any moles change over time.   Most skin changes are not cancer. But if you see any changes in your skin, call your doctor right away. Only he or she can diagnose a problem. If you have skin cancer, seeing your doctor can be the first step toward getting the treatment that could save your life.   [employer] last reviewed this educational content on 4/1/2019 2000-2020 [employer]. [address] [place] [address], [place] [address]. All rights reserved. This information is not intended as a substitute for professional medical care. Always follow your healthcare professional's instructions.       When should I call my doctor?  If you are worsening or not improving, please, contact us or seek urgent care as noted below.     Who should I call with questions (adults)?  [facility] (adult and pediatric): [phone]  [facility] (adult): [phone]  [facility] ([facility] and [place]) [phone]  For urgent needs outside of business hours call the [facility] at [phone] and ask for the dermatology resident on call to be paged  If this is a medical emergency and you are unable to reach an ER, Call [phone]      If you need a prescription refill, please contact your pharmacy. Refills are approved or denied by our Physicians during normal business hours, Monday through Fridays  Per office policy, refills will not be granted if you have not been seen within the past year (or sooner depending on your child's condition

## 2025-05-07 NOTE — PROGRESS NOTES
[facility] Dermatology Note  Encounter Date: May 7, 2025  Office Visit     Reviewed patients past medical history and pertinent chart review prior to patients visit today.     Dermatology Problem List:  Last skin check: 5/7/2025  1. Hidradenitis suppurativa, Quiñonez stage 1  - Past t/x: Doxycycline 100 mg BID x3 months  - Current t/x: BPO wash, clindamycin 1% daily  2. ISK, s/p cryo  3. Hand dermatitis  - clobetasol cream    Personal Hx: No personal history of skin cancer  Family Hx: Negative for family history of skin cancer.    Social Hx: Works as an   _________________________________________    Assessment & Plan:     # Irritant contact dermatitis, hands  -Given clobetasol cream to use BID until resolved (no more than 21 days) and then BID PRN for flares.  Councled about use and side effects of thinning of the skin, striae, erythema, and worsening of rash. Not for use on face or skin folds.  -After hand washing, patient should apply a moisturizing cream immediately afterwards. Examples include Cetaphil cream, Cera Ve Cream or Vanicream.      # Inflamed Seborrheic Keratosis, left superior forehead   -The benign nature of the skin lesion(s) was discussed with the patient. The patient requested treatment intervention due to finding the lesions to be bothersome. Discussed cryotherapy treatment with patient. Patient elects to pursue cryotherapy today. After verbal consent and discussion of risks and benefits including but no limited to dyspigmentation/scar, blister, and pain, 1 was(were) treated with 1-2mm freeze border for 2 cycles with liquid nitrogen. Post cryotherapy instructions were provided.     # Hidradenitis suppurativa, Quiñonez stage I  - Continue clindamycin 1% lotion and benzoyl peroxide wash.    # Multiple nevi, trunk and extremities  # Solar lentigines  - Continued observation recommended.   - Nevi demonstrate no concerning features on dermoscopy. We discussed the importance  of self exams at home.   - ABCDEs: Counseled ABCDEs of melanoma: Asymmetry, Border (irregularity), Color (not uniform, changes in color), Diameter (greater than 6 mm which is about the size of a pencil eraser), and Evolving (any changes in preexisting moles).  - Sun protection: Counseled SPF 30+ sunscreen, UPF clothing, sun avoidance, tanning bed avoidance.    # Cherry angiomas  # Seborrheic keratoses  # Dermatofibroma, right shin  - We discussed the benign nature of the skin lesions. No treatment required. Continued observation recommended. Follow up with any concerns.      Follow-up: 1 year(s) for follow up full body skin exam, prn for new or changing lesions or new concerns    All risks, benefits and alternatives were discussed with patient.  Patient is in agreement and understands the assessment and plan.  All questions were answered.  Yissel Kahn PA-C  [facility]  ____________________________________________    CC: Skin cancer screening exam    HPI:  Ms. Isaura Mcmahan is a(n) 59 year old female who presents today as a return patient for a full body skin cancer screening. Patient has concerns today about a lesion on the left superior forehead. This lesion has been present for years. It does not itch, bleed, or cause pain. It has grown in size and has become darker in color.    Patient is not diligent with photoprotection. Patient is otherwise feeling well, without additional skin concerns.     Physical Exam:  Vitals: LMP  (LMP Unknown)   SKIN: Total skin excluding the genitalia areas was performed. The exam included the head/face, neck, both arms, chest, back, abdomen, both legs, digits, mons pubis, buttock and nails.   -Alcantar II  - broad ill defined scaly plaques involving the fingers and hands consistent with hand dermatitis  -right shin, firm, hyperpigmented papule that dimples upon manipulation   -left superior forehead, waxy, stuck on tan to brown plaque  -multiple tan/brown  flat round macules and raised papules scattered throughout trunk, extremities, and face. No worrisome features for malignancy noted on examination.  -scattered tan, homogenous macules scattered on sun exposed areas of trunk, extremities, and face  -scattered waxy, stuck on tan/brown papules and patches on the trunk and extremities  -several 1-2mm red dome shaped symmetric papules scattered on the trunk and extremities    - No other lesions of concern on areas examined.     Medications:  Current Outpatient Medications   Medication Sig Dispense Refill    amLODIPine (NORVASC) 2.5 MG tablet TAKE 1 TABLET(2.5 MG) BY MOUTH DAILY 90 tablet 0    atorvastatin (LIPITOR) 20 MG tablet TAKE 1 TABLET(20 MG) BY MOUTH DAILY 90 tablet 0    benzonatate (TESSALON) 200 MG capsule Take 1 capsule (200 mg) by mouth 3 times daily as needed for cough. 21 capsule 0    benzoyl peroxide 5 % external liquid Use topically as body wash few times weekly for prevention 236 mL 3    clindamycin (CLEOCIN T) 1 % external lotion Apply topically 2 times daily 60 mL 3    hydrochlorothiazide (HYDRODIURIL) 25 MG tablet Take 1 tablet (25 mg) by mouth daily 90 tablet 3    potassium chloride ER (K-TAB/KLOR-CON) 10 MEQ CR tablet TAKE 1 TABLET(10 MEQ) BY MOUTH DAILY 90 tablet 0    predniSONE (DELTASONE) 20 MG tablet Take 1 tablet (20 mg) by mouth daily for 5 days. 5 tablet 0    scopolamine (TRANSDERM) 1 MG/3DAYS 72 hr patch Place 1 patch onto the skin every 72 hours 10 patch 2     No current facility-administered medications for this visit.      Past Medical History:   Patient Active Problem List   Diagnosis    Hyperlipidemia LDL goal <130    Hypertension goal BP (blood pressure) < 140/90    Obesity    CARON (obstructive sleep apnea)- mild (AHI 10)    Morbid obesity (H)    Bunion, right     Past Medical History:   Diagnosis Date    ASCUS on Pap smear     2011, neg HPV    Cancer (H)     History of blood transfusion     Hypertension        CC Referred Self, MD  No  address on file on close of this encounter.

## 2025-05-07 NOTE — LETTER
5/7/2025      Isaura Mcmahan  [address] [place] [address]      Dear Colleague,    Thank you for referring your patient, Isaura Mcmahan, to the [facility]. Please see a copy of my visit note below.    [facility] Dermatology Note  Encounter Date: May 7, 2025  Office Visit     Reviewed patients past medical history and pertinent chart review prior to patients visit today.     Dermatology Problem List:  Last skin check: 5/7/2025  1. Hidradenitis suppurativa, Quiñonez stage 1  - Past t/x: Doxycycline 100 mg BID x3 months  - Current t/x: BPO wash, clindamycin 1% daily  2. ISK, s/p cryo  3. Hand dermatitis  - clobetasol cream    Personal Hx: No personal history of skin cancer  Family Hx: Negative for family history of skin cancer.    Social Hx: Works as an   _________________________________________    Assessment & Plan:     # Irritant contact dermatitis, hands  -Given clobetasol cream to use BID until resolved (no more than 21 days) and then BID PRN for flares.  Councled about use and side effects of thinning of the skin, striae, erythema, and worsening of rash. Not for use on face or skin folds.  -After hand washing, patient should apply a moisturizing cream immediately afterwards. Examples include Cetaphil cream, Cera Ve Cream or Vanicream.      # Inflamed Seborrheic Keratosis, left superior forehead   -The benign nature of the skin lesion(s) was discussed with the patient. The patient requested treatment intervention due to finding the lesions to be bothersome. Discussed cryotherapy treatment with patient. Patient elects to pursue cryotherapy today. After verbal consent and discussion of risks and benefits including but no limited to dyspigmentation/scar, blister, and pain, 1 was(were) treated with 1-2mm freeze border for 2 cycles with liquid nitrogen. Post cryotherapy instructions were provided.     # Hidradenitis suppurativa, Quiñonez stage  I  - Continue clindamycin 1% lotion and benzoyl peroxide wash.    # Multiple nevi, trunk and extremities  # Solar lentigines  - Continued observation recommended.   - Nevi demonstrate no concerning features on dermoscopy. We discussed the importance of self exams at home.   - ABCDEs: Counseled ABCDEs of melanoma: Asymmetry, Border (irregularity), Color (not uniform, changes in color), Diameter (greater than 6 mm which is about the size of a pencil eraser), and Evolving (any changes in preexisting moles).  - Sun protection: Counseled SPF 30+ sunscreen, UPF clothing, sun avoidance, tanning bed avoidance.    # Cherry angiomas  # Seborrheic keratoses  # Dermatofibroma, right shin  - We discussed the benign nature of the skin lesions. No treatment required. Continued observation recommended. Follow up with any concerns.      Follow-up: 1 year(s) for follow up full body skin exam, prn for new or changing lesions or new concerns    All risks, benefits and alternatives were discussed with patient.  Patient is in agreement and understands the assessment and plan.  All questions were answered.  Yissel Kahn PA-C  [facility] Dermatology  ____________________________________________    CC: Skin cancer screening exam    HPI:  Ms. Isaura Mcmahan is a(n) 59 year old female who presents today as a return patient for a full body skin cancer screening. Patient has concerns today about a lesion on the left superior forehead. This lesion has been present for years. It does not itch, bleed, or cause pain. It has grown in size and has become darker in color.    Patient is not diligent with photoprotection. Patient is otherwise feeling well, without additional skin concerns.     Physical Exam:  Vitals: LMP  (LMP Unknown)   SKIN: Total skin excluding the genitalia areas was performed. The exam included the head/face, neck, both arms, chest, back, abdomen, both legs, digits, mons pubis, buttock and nails.   -Alcantar II  -  broad ill defined scaly plaques involving the fingers and hands consistent with hand dermatitis  -right shin, firm, hyperpigmented papule that dimples upon manipulation   -left superior forehead, waxy, stuck on tan to brown plaque  -multiple tan/brown flat round macules and raised papules scattered throughout trunk, extremities, and face. No worrisome features for malignancy noted on examination.  -scattered tan, homogenous macules scattered on sun exposed areas of trunk, extremities, and face  -scattered waxy, stuck on tan/brown papules and patches on the trunk and extremities  -several 1-2mm red dome shaped symmetric papules scattered on the trunk and extremities    - No other lesions of concern on areas examined.     Medications:  Current Outpatient Medications   Medication Sig Dispense Refill     amLODIPine (NORVASC) 2.5 MG tablet TAKE 1 TABLET(2.5 MG) BY MOUTH DAILY 90 tablet 0     atorvastatin (LIPITOR) 20 MG tablet TAKE 1 TABLET(20 MG) BY MOUTH DAILY 90 tablet 0     benzonatate (TESSALON) 200 MG capsule Take 1 capsule (200 mg) by mouth 3 times daily as needed for cough. 21 capsule 0     benzoyl peroxide 5 % external liquid Use topically as body wash few times weekly for prevention 236 mL 3     clindamycin (CLEOCIN T) 1 % external lotion Apply topically 2 times daily 60 mL 3     hydrochlorothiazide (HYDRODIURIL) 25 MG tablet Take 1 tablet (25 mg) by mouth daily 90 tablet 3     potassium chloride ER (K-TAB/KLOR-CON) 10 MEQ CR tablet TAKE 1 TABLET(10 MEQ) BY MOUTH DAILY 90 tablet 0     predniSONE (DELTASONE) 20 MG tablet Take 1 tablet (20 mg) by mouth daily for 5 days. 5 tablet 0     scopolamine (TRANSDERM) 1 MG/3DAYS 72 hr patch Place 1 patch onto the skin every 72 hours 10 patch 2     No current facility-administered medications for this visit.      Past Medical History:   Patient Active Problem List   Diagnosis     Hyperlipidemia LDL goal <130     Hypertension goal BP (blood pressure) < 140/90     Obesity      CARON (obstructive sleep apnea)- mild (AHI 10)     Morbid obesity (H)     Bunion, right     Past Medical History:   Diagnosis Date     ASCUS on Pap smear     2011, neg HPV     Cancer (H)      History of blood transfusion      Hypertension        CC Referred Self, MD  No address on file on close of this encounter.    Again, thank you for allowing me to participate in the care of your patient.        Sincerely,        Beatriz Kahn PA-C    Electronically signed

## 2025-05-11 ENCOUNTER — HEALTH MAINTENANCE LETTER (OUTPATIENT)
Age: 60
End: 2025-05-11

## 2025-05-13 SDOH — HEALTH STABILITY: PHYSICAL HEALTH: ON AVERAGE, HOW MANY MINUTES DO YOU ENGAGE IN EXERCISE AT THIS LEVEL?: 0 MIN

## 2025-05-13 SDOH — HEALTH STABILITY: PHYSICAL HEALTH: ON AVERAGE, HOW MANY DAYS PER WEEK DO YOU ENGAGE IN MODERATE TO STRENUOUS EXERCISE (LIKE A BRISK WALK)?: 0 DAYS

## 2025-05-13 ASSESSMENT — SOCIAL DETERMINANTS OF HEALTH (SDOH): HOW OFTEN DO YOU GET TOGETHER WITH FRIENDS OR RELATIVES?: ONCE A WEEK

## 2025-05-14 ENCOUNTER — OFFICE VISIT (OUTPATIENT)
Dept: FAMILY MEDICINE | Facility: CLINIC | Age: 60
End: 2025-05-14
Payer: COMMERCIAL

## 2025-05-14 VITALS
OXYGEN SATURATION: 97 % | DIASTOLIC BLOOD PRESSURE: 68 MMHG | HEART RATE: 53 BPM | HEIGHT: 66 IN | SYSTOLIC BLOOD PRESSURE: 120 MMHG | WEIGHT: 223 LBS | RESPIRATION RATE: 16 BRPM | BODY MASS INDEX: 35.84 KG/M2 | TEMPERATURE: 97.6 F

## 2025-05-14 DIAGNOSIS — Z78.0 ASYMPTOMATIC MENOPAUSAL STATE: ICD-10-CM

## 2025-05-14 DIAGNOSIS — E87.6 DIURETIC-INDUCED HYPOKALEMIA: ICD-10-CM

## 2025-05-14 DIAGNOSIS — T50.2X5A DIURETIC-INDUCED HYPOKALEMIA: ICD-10-CM

## 2025-05-14 DIAGNOSIS — Z00.00 ROUTINE GENERAL MEDICAL EXAMINATION AT A HEALTH CARE FACILITY: Primary | ICD-10-CM

## 2025-05-14 DIAGNOSIS — E66.01 SEVERE OBESITY WITH BODY MASS INDEX (BMI) OF 35.0 TO 39.9 WITH COMORBIDITY (H): ICD-10-CM

## 2025-05-14 DIAGNOSIS — E78.5 HYPERLIPIDEMIA LDL GOAL <130: ICD-10-CM

## 2025-05-14 DIAGNOSIS — I10 HYPERTENSION GOAL BP (BLOOD PRESSURE) < 140/90: ICD-10-CM

## 2025-05-14 DIAGNOSIS — R73.03 PREDIABETES: ICD-10-CM

## 2025-05-14 LAB
ANION GAP SERPL CALCULATED.3IONS-SCNC: 13 MMOL/L (ref 7–15)
BUN SERPL-MCNC: 15.9 MG/DL (ref 8–23)
CALCIUM SERPL-MCNC: 9.5 MG/DL (ref 8.8–10.4)
CHLORIDE SERPL-SCNC: 104 MMOL/L (ref 98–107)
CHOLEST SERPL-MCNC: 200 MG/DL
CREAT SERPL-MCNC: 0.72 MG/DL (ref 0.51–0.95)
EGFRCR SERPLBLD CKD-EPI 2021: >90 ML/MIN/1.73M2
EST. AVERAGE GLUCOSE BLD GHB EST-MCNC: 117 MG/DL
FASTING STATUS PATIENT QL REPORTED: ABNORMAL
FASTING STATUS PATIENT QL REPORTED: ABNORMAL
GLUCOSE SERPL-MCNC: 103 MG/DL (ref 70–99)
HBA1C MFR BLD: 5.7 % (ref 0–5.6)
HCO3 SERPL-SCNC: 25 MMOL/L (ref 22–29)
HDLC SERPL-MCNC: 75 MG/DL
LDLC SERPL CALC-MCNC: 113 MG/DL
NONHDLC SERPL-MCNC: 125 MG/DL
POTASSIUM SERPL-SCNC: 3.7 MMOL/L (ref 3.4–5.3)
SODIUM SERPL-SCNC: 142 MMOL/L (ref 135–145)
TRIGL SERPL-MCNC: 58 MG/DL

## 2025-05-14 PROCEDURE — 80048 BASIC METABOLIC PNL TOTAL CA: CPT | Performed by: PHYSICIAN ASSISTANT

## 2025-05-14 PROCEDURE — 99396 PREV VISIT EST AGE 40-64: CPT | Performed by: PHYSICIAN ASSISTANT

## 2025-05-14 PROCEDURE — 83036 HEMOGLOBIN GLYCOSYLATED A1C: CPT | Performed by: PHYSICIAN ASSISTANT

## 2025-05-14 PROCEDURE — 1126F AMNT PAIN NOTED NONE PRSNT: CPT | Performed by: PHYSICIAN ASSISTANT

## 2025-05-14 PROCEDURE — 3078F DIAST BP <80 MM HG: CPT | Performed by: PHYSICIAN ASSISTANT

## 2025-05-14 PROCEDURE — 3074F SYST BP LT 130 MM HG: CPT | Performed by: PHYSICIAN ASSISTANT

## 2025-05-14 PROCEDURE — 36415 COLL VENOUS BLD VENIPUNCTURE: CPT | Performed by: PHYSICIAN ASSISTANT

## 2025-05-14 PROCEDURE — 99214 OFFICE O/P EST MOD 30 MIN: CPT | Mod: 25 | Performed by: PHYSICIAN ASSISTANT

## 2025-05-14 PROCEDURE — 80061 LIPID PANEL: CPT | Performed by: PHYSICIAN ASSISTANT

## 2025-05-14 RX ORDER — HYDROCHLOROTHIAZIDE 25 MG/1
25 TABLET ORAL DAILY
Qty: 90 TABLET | Refills: 3 | Status: SHIPPED | OUTPATIENT
Start: 2025-05-14

## 2025-05-14 RX ORDER — POTASSIUM CHLORIDE 750 MG/1
10 TABLET, EXTENDED RELEASE ORAL
Qty: 90 TABLET | Refills: 0 | Status: SHIPPED | OUTPATIENT
Start: 2025-05-14

## 2025-05-14 RX ORDER — AMLODIPINE BESYLATE 2.5 MG/1
2.5 TABLET ORAL DAILY
Qty: 90 TABLET | Refills: 0 | Status: SHIPPED | OUTPATIENT
Start: 2025-05-14

## 2025-05-14 RX ORDER — ATORVASTATIN CALCIUM 20 MG/1
20 TABLET, FILM COATED ORAL DAILY
Qty: 90 TABLET | Refills: 0 | Status: SHIPPED | OUTPATIENT
Start: 2025-05-14

## 2025-05-14 ASSESSMENT — PAIN SCALES - GENERAL: PAINLEVEL_OUTOF10: NO PAIN (0)

## 2025-05-14 NOTE — PATIENT INSTRUCTIONS
Get 1500 mg of calcium and 1000 IU vitamin D daily through diet and supplements. Dairy products and calcium fortified foods generally have 300 mg calcium and 100 IU vitamin D. Recommended supplements are calcium carbonate or citrate and vitamin D3 (cholecalciferol). For optimal absorption, take no more than 600 mg of calcium per dose.             Patient Education   Preventive Care Advice   This is general advice given by our system to help you stay healthy. However, your care team may have specific advice just for you. Please talk to your care team about your preventive care needs.  Nutrition  Eat 5 or more servings of fruits and vegetables each day.  Try wheat bread, brown rice and whole grain pasta (instead of white bread, rice, and pasta).  Get enough calcium and vitamin D. Check the label on foods and aim for 100% of the RDA (recommended daily allowance).  Lifestyle  Exercise at least 150 minutes each week  (30 minutes a day, 5 days a week).  Do muscle strengthening activities 2 days a week. These help control your weight and prevent disease.  No smoking.  Wear sunscreen to prevent skin cancer.  Have a dental exam and cleaning every 6 months.  Yearly exams  See your health care team every year to talk about:  Any changes in your health.  Any medicines your care team has prescribed.  Preventive care, family planning, and ways to prevent chronic diseases.  Shots (vaccines)   HPV shots (up to age 26), if you've never had them before.  Hepatitis B shots (up to age 59), if you've never had them before.  COVID-19 shot: Get this shot when it's due.  Flu shot: Get a flu shot every year.  Tetanus shot: Get a tetanus shot every 10 years.  Pneumococcal, hepatitis A, and RSV shots: Ask your care team if you need these based on your risk.  Shingles shot (for age 50 and up)  General health tests  Diabetes screening:  Starting at age 35, Get screened for diabetes at least every 3 years.  If you are younger than age 35, ask  your care team if you should be screened for diabetes.  Cholesterol test: At age 39, start having a cholesterol test every 5 years, or more often if advised.  Bone density scan (DEXA): At age 50, ask your care team if you should have this scan for osteoporosis (brittle bones).  Hepatitis C: Get tested at least once in your life.  STIs (sexually transmitted infections)  Before age 24: Ask your care team if you should be screened for STIs.  After age 24: Get screened for STIs if you're at risk. You are at risk for STIs (including HIV) if:  You are sexually active with more than one person.  You don't use condoms every time.  You or a partner was diagnosed with a sexually transmitted infection.  If you are at risk for HIV, ask about PrEP medicine to prevent HIV.  Get tested for HIV at least once in your life, whether you are at risk for HIV or not.  Cancer screening tests  Cervical cancer screening: If you have a cervix, begin getting regular cervical cancer screening tests starting at age 21.  Breast cancer scan (mammogram): If you've ever had breasts, begin having regular mammograms starting at age 40. This is a scan to check for breast cancer.  Colon cancer screening: It is important to start screening for colon cancer at age 45.  Have a colonoscopy test every 10 years (or more often if you're at risk) Or, ask your provider about stool tests like a FIT test every year or Cologuard test every 3 years.  To learn more about your testing options, visit:   .  For help making a decision, visit:   https://bit.ly/bj16878.  Prostate cancer screening test: If you have a prostate, ask your care team if a prostate cancer screening test (PSA) at age 55 is right for you.  Lung cancer screening: If you are a current or former smoker ages 50 to 80, ask your care team if ongoing lung cancer screenings are right for you.  For informational purposes only. Not to replace the advice of your health care provider. Copyright   2023  [facility]. All rights reserved. Clinically reviewed by the [employer] [facility]. [employer] [id number] - REV 01/24.

## 2025-05-14 NOTE — PROGRESS NOTES
"Preventive Care Visit  [facility] Kristen M. Kehr, PA-C, Family Medicine  May 14, 2025      Assessment & Plan     Routine general medical examination at a health care facility  Health maintenance reviewed and updated.      Hypertension goal BP (blood pressure) < 140/90  Doing well, refills given   Check BMP today.   - BASIC METABOLIC PANEL; Future  - amLODIPine (NORVASC) 2.5 MG tablet; Take 1 tablet (2.5 mg) by mouth daily.  - hydrochlorothiazide (HYDRODIURIL) 25 MG tablet; Take 1 tablet (25 mg) by mouth daily.  - BASIC METABOLIC PANEL    Hyperlipidemia LDL goal <130  Stable on the atorvastatin 20 mg   Check lipids today.   - Lipid panel reflex to direct LDL Fasting; Future  - atorvastatin (LIPITOR) 20 MG tablet; Take 1 tablet (20 mg) by mouth daily.  - Lipid panel reflex to direct LDL Fasting    Diuretic-induced hypokalemia  Continue potassium  - potassium chloride ER (K-TAB/KLOR-CON) 10 MEQ CR tablet; Take 1 tablet (10 mEq) by mouth daily at 2 pm.    Asymptomatic menopausal state  Due for bone density. She has noticed loss of height also.   Calcium supplements discussed.   - DX Bone Density; Future    Prediabetes  Check A1C  Continue healthy lifestyle modifications.   - Hemoglobin A1c; Future  - Hemoglobin A1c    Severe obesity with body mass index (BMI) of 35.0 to 39.9 with comorbidity (H)  Work on healthy lifestyle habits.             BMI  Estimated body mass index is 35.72 kg/m  as calculated from the following:    Height as of this encounter: 1.683 m (5' 6.25\").    Weight as of this encounter: 101.2 kg (223 lb).   Weight management plan: Discussed healthy diet and exercise guidelines    Counseling  Appropriate preventive services were addressed with this patient via screening, questionnaire, or discussion as appropriate for fall prevention, nutrition, physical activity, Tobacco-use cessation, social engagement, weight loss and cognition.  Checklist reviewing preventive services available " has been given to the patient.  Reviewed patient's diet, addressing concerns and/or questions.           Subjective   Isaura is a 59 year old, presenting for the following:  Physical        5/14/2025     9:39 AM   Additional Questions   Roomed by Demetrius ROTHMAN  Isaura is here today for preventative care and to address the following conditions:    Hypertension: managed with amlodipine and hydrochlorothiazide. BP is well controlled   Hyperlipidemia: statin was adjusted last year. She is due to have lab tests completed again.   Prediabetes: managed with diet and exercise.   She was also recently treated through Urgent Care for a cough. The prednisone has been helpful and she is getting better.         Advance Care Planning    Patient states has Health Care Directive and will send to [employer].        5/13/2025   General Health   How would you rate your overall physical health? Good   Feel stress (tense, anxious, or unable to sleep) Not at all         5/13/2025   Nutrition   Three or more servings of calcium each day? (!) NO   Diet: Other   If other, please elaborate: I follow the [employer] diet plan, higher protein low sugar. low fat.   How many servings of fruit and vegetables per day? 4 or more   How many sweetened beverages each day? 0-1         5/13/2025   Exercise   Days per week of moderate/strenous exercise 0 days   Average minutes spent exercising at this level 0 min   (!) EXERCISE CONCERN      5/13/2025   Social Factors   Frequency of gathering with friends or relatives Once a week   Worry food won't last until get money to buy more No   Food not last or not have enough money for food? No   Do you have housing? (Housing is defined as stable permanent housing and does not include staying outside in a car, in a tent, in an abandoned building, in an overnight shelter, or couch-surfing.) Yes   Are you worried about losing your housing? No   Lack of transportation? No   Unable to get utilities  (heat,electricity)? No         5/13/2025   Fall Risk   Fallen 2 or more times in the past year? No   Trouble with walking or balance? No          5/13/2025   Dental   Dentist two times every year? Yes           Today's PHQ-2 Score:       5/7/2025     3:31 PM   PHQ-2 ( 1999 [employer])   Q1: Little interest or pleasure in doing things 0   Q2: Feeling down, depressed or hopeless 0   PHQ-2 Score 0         5/13/2025   Substance Use   Alcohol more than 3/day or more than 7/wk Not Applicable   Do you use any other substances recreationally? No     Social History     Tobacco Use    Smoking status: Never    Smokeless tobacco: Never    Tobacco comments:     per chart   Vaping Use    Vaping status: Never Used   Substance Use Topics    Alcohol use: Not Currently     Comment: occassional    Drug use: No           10/31/2024   LAST FHS-7 RESULTS   1st degree relative breast or ovarian cancer No   Any relative bilateral breast cancer No   Any male have breast cancer No   Any ONE woman have BOTH breast AND ovarian cancer No   Any woman with breast cancer before 50yrs No   2 or more relatives with breast AND/OR ovarian cancer No   2 or more relatives with breast AND/OR bowel cancer No        Mammogram Screening - Mammogram every 1-2 years updated in Health Maintenance based on mutual decision making        5/13/2025   STI Screening   New sexual partner(s) since last STI/HIV test? No     History of abnormal Pap smear: No - age 30- 64 PAP with HPV every 5 years recommended        Latest Ref Rng & Units 5/1/2024     9:17 AM 8/6/2019     7:52 AM 8/6/2019     7:45 AM   PAP / HPV   PAP  Negative for Intraepithelial Lesion or Malignancy (NILM)      PAP (Historical)   NIL     HPV 16 DNA Negative Negative   Negative    HPV 18 DNA Negative Negative   Negative    Other HR HPV Negative Negative   Negative      ASCVD Risk   The 10-year ASCVD risk score (Edenilson EVANS, et al., 2019) is: 3.5%    Values used to calculate the score:      Age: 59  years      Sex: Female      Is Non- : No      Diabetic: No      Tobacco smoker: No      Systolic Blood Pressure: 120 mmHg      Is BP treated: Yes      HDL Cholesterol: 65 mg/dL      Total Cholesterol: 198 mg/dL           Reviewed and updated as needed this visit by Provider   Tobacco  Allergies  Meds  Problems  Med Hx  Surg Hx  Fam Hx            Past Medical History:   Diagnosis Date    ASCUS on Pap smear     , neg HPV    Cancer (H)     History of blood transfusion     Hypertension      Past Surgical History:   Procedure Laterality Date    COLONOSCOPY      COLONOSCOPY WITH CO2 INSUFFLATION N/A 2016    Procedure: COLONOSCOPY WITH CO2 INSUFFLATION;  Surgeon: Segundo Crane MD;  Location: [facility] OR    DRAIN PILONIDAL CYST SIMPL  2004    left wrist cyst removal    LAPAROSCOPIC TUBAL LIGATION       OB History    Para Term  AB Living   3 2 2 0 1 2   SAB IAB Ectopic Multiple Live Births   1 0 0 0 2      # Outcome Date GA Lbr Oniel/2nd Weight Sex Type Anes PTL Lv   3 Term 01 40w0d       MERA   2 SAB 2000           1 Term 94 40w0d       MERA     BP Readings from Last 3 Encounters:   25 120/68   25 (!) 148/81   24 (!) 154/78    Wt Readings from Last 3 Encounters:   25 101.2 kg (223 lb)   25 103.4 kg (228 lb)   24 103.4 kg (228 lb)                  Patient Active Problem List   Diagnosis    Hyperlipidemia LDL goal <130    Hypertension goal BP (blood pressure) < 140/90    Obesity    CARON (obstructive sleep apnea)- mild (AHI 10)    Bunion, right     Past Surgical History:   Procedure Laterality Date    COLONOSCOPY      COLONOSCOPY WITH CO2 INSUFFLATION N/A 2016    Procedure: COLONOSCOPY WITH CO2 INSUFFLATION;  Surgeon: Segundo Crane MD;  Location: [facility] OR    DRAIN PILONIDAL CYST SIMPL  2004    left wrist cyst removal    LAPAROSCOPIC TUBAL LIGATION         Social History     Tobacco Use    Smoking  status: Never    Smokeless tobacco: Never    Tobacco comments:     per chart   Substance Use Topics    Alcohol use: Not Currently     Comment: occassional     Family History   Problem Relation Age of Onset    Hypertension Mother     Hyperlipidemia Mother     Other Cancer Mother         Lung and Brain    Cancer Father         prostate    Prostate Cancer Father     Other Cancer Father         bone    Diabetes Maternal Grandmother     Cancer Brother         testicular    Other Cancer Brother         testicular/testicular    Hypertension Brother     Hyperlipidemia Brother     Prostate Cancer Brother     Other Cancer Brother         Testicular         Current Outpatient Medications   Medication Sig Dispense Refill    amLODIPine (NORVASC) 2.5 MG tablet Take 1 tablet (2.5 mg) by mouth daily. 90 tablet 0    atorvastatin (LIPITOR) 20 MG tablet Take 1 tablet (20 mg) by mouth daily. 90 tablet 0    benzonatate (TESSALON) 200 MG capsule Take 1 capsule (200 mg) by mouth 3 times daily as needed for cough. 21 capsule 0    benzoyl peroxide 5 % external liquid Use topically as body wash few times weekly for prevention 236 mL 3    clindamycin (CLEOCIN T) 1 % external lotion Apply topically 2 times daily. 60 mL 3    clobetasol propionate (TEMOVATE) 0.05 % external cream Apply to affected areas on the hands twice daily for up to 2-3 weeks. Thereafter, may use as needed for flares. Do not apply to face or skin folds. 60 g 1    hydrochlorothiazide (HYDRODIURIL) 25 MG tablet Take 1 tablet (25 mg) by mouth daily. 90 tablet 3    potassium chloride ER (K-TAB/KLOR-CON) 10 MEQ CR tablet Take 1 tablet (10 mEq) by mouth daily at 2 pm. 90 tablet 0    scopolamine (TRANSDERM) 1 MG/3DAYS 72 hr patch Place 1 patch onto the skin every 72 hours 10 patch 2     Allergies   Allergen Reactions    Amoxicillin Hives    Ciprofloxacin Swelling     Eyelid swelling after drops     Recent Labs   Lab Test 05/14/25  1014 05/01/24  0925 05/02/23  0841 08/28/22  0943  "08/28/22  0943 07/02/21  0819 01/10/21  0903   A1C 5.7* 5.9*  --   --  5.8* 5.5 5.6   LDL  --  100  --   --  109* 104* 123*   HDL  --  65  --   --  56 55 58   TRIG  --  166*  --   --  221* 70 96   CR  --  0.80 0.77   < > 0.69 0.83 0.86   GFRESTIMATED  --  84 89   < > >90 79 76   GFRESTBLACK  --   --   --   --   --  >90 88   POTASSIUM  --  3.5 3.5   < > 3.4 3.8 3.5    < > = values in this interval not displayed.               Review of Systems  Constitutional, HEENT, cardiovascular, pulmonary, GI, , musculoskeletal, neuro, skin, endocrine and psych systems are negative, except as otherwise noted.     Objective    Exam  /68   Pulse 53   Temp 97.6  F (36.4  C) (Tympanic)   Resp 16   Ht 1.683 m (5' 6.25\")   Wt 101.2 kg (223 lb)   LMP  (LMP Unknown)   SpO2 97%   Breastfeeding No   BMI 35.72 kg/m     Estimated body mass index is 35.72 kg/m  as calculated from the following:    Height as of this encounter: 1.683 m (5' 6.25\").    Weight as of this encounter: 101.2 kg (223 lb).    Physical Exam  GENERAL: alert and no distress  EYES: Eyes grossly normal to inspection, PERRL and conjunctivae and sclerae normal  HENT: ear canals and TM's normal, nose and mouth without ulcers or lesions  NECK: no adenopathy, no asymmetry, masses, or scars  RESP: lungs clear to auscultation - no rales, rhonchi or wheezes  BREAST: normal without masses, tenderness or nipple discharge and no palpable axillary masses or adenopathy  CV: regular rate and rhythm, normal S1 S2, no S3 or S4, no murmur, click or rub, no peripheral edema  ABDOMEN: soft, nontender, no hepatosplenomegaly, no masses and bowel sounds normal  MS: no gross musculoskeletal defects noted, no edema  SKIN: no suspicious lesions or rashes  NEURO: Normal strength and tone, mentation intact and speech normal  PSYCH: mentation appears normal, affect normal/bright        Signed Electronically by: Kristen M. Kehr, PA-C    "

## 2025-05-15 ENCOUNTER — RESULTS FOLLOW-UP (OUTPATIENT)
Dept: FAMILY MEDICINE | Facility: CLINIC | Age: 60
End: 2025-05-15

## 2025-06-24 ENCOUNTER — OFFICE VISIT (OUTPATIENT)
Dept: FAMILY MEDICINE | Facility: CLINIC | Age: 60
End: 2025-06-24
Payer: COMMERCIAL

## 2025-06-24 VITALS
HEART RATE: 64 BPM | OXYGEN SATURATION: 96 % | RESPIRATION RATE: 16 BRPM | TEMPERATURE: 97.1 F | BODY MASS INDEX: 36.26 KG/M2 | HEIGHT: 67 IN | DIASTOLIC BLOOD PRESSURE: 76 MMHG | WEIGHT: 231 LBS | SYSTOLIC BLOOD PRESSURE: 124 MMHG

## 2025-06-24 DIAGNOSIS — M79.662 PAIN OF LEFT CALF: Primary | ICD-10-CM

## 2025-06-24 DIAGNOSIS — M54.31 RIGHT SIDED SCIATICA: ICD-10-CM

## 2025-06-24 PROCEDURE — 3078F DIAST BP <80 MM HG: CPT | Performed by: PHYSICIAN ASSISTANT

## 2025-06-24 PROCEDURE — 3074F SYST BP LT 130 MM HG: CPT | Performed by: PHYSICIAN ASSISTANT

## 2025-06-24 PROCEDURE — 1125F AMNT PAIN NOTED PAIN PRSNT: CPT | Performed by: PHYSICIAN ASSISTANT

## 2025-06-24 PROCEDURE — 99213 OFFICE O/P EST LOW 20 MIN: CPT | Performed by: PHYSICIAN ASSISTANT

## 2025-06-24 ASSESSMENT — PAIN SCALES - GENERAL: PAINLEVEL_OUTOF10: MODERATE PAIN (4)

## 2025-06-24 NOTE — PROGRESS NOTES
"  Assessment & Plan     Pain of left calf  Ultrasound ordered. She will schedule the appointment.   - US Lower Extremity Venous Duplex Left; Future    Right sided sciatica  Plans to go to the chiropractor to start treatment.   Discussed therapy referral also if she desires.                 Subjective   Isaura is a 60 year old, presenting for the following health issues:  Pain (Possible sciatic on right side and left leg)    History of Present Illness       Reason for visit:  I think I have a sciatic situation on my right side and recently left leg pain and top of left foot pain.  Symptoms include:  Pain, feeling of tightness  Symptom intensity:  Moderate  Symptom progression:  Staying the same  Had these symptoms before:  No  What makes it worse:  No  What makes it better:  After sleeping at night it feels better in the morning.   She is taking medications regularly.        Isaura is here today for right sciatica and now having left calf pain.   The sciatica has been present for a few months. She plans to work with her chiropractor for management.  The pain in the left calf started a month after the sciatic pain started.   She reports more of a full / tight sensation in the calf. No notable swelling. When it first started, she felt that her leg gave out on her a few times. She reports no pain in her knee or hip. No numbness or tingling sensation. No low back pain associated with either pain on the right or left.               Review of Systems  Constitutional, HEENT, cardiovascular, pulmonary, GI, , musculoskeletal, neuro, skin, endocrine and psych systems are negative, except as otherwise noted.      Objective    /76   Pulse 64   Temp 97.1  F (36.2  C) (Tympanic)   Resp 16   Ht 1.689 m (5' 6.5\")   Wt 104.8 kg (231 lb)   LMP  (LMP Unknown)   SpO2 96%   BMI 36.73 kg/m    Body mass index is 36.73 kg/m .  Physical Exam   GENERAL: alert and no distress  MS: no gross musculoskeletal defects noted, no " edema. Koby's negative on the left, no tenderness or palpable cord. Gait is normal.   SKIN: no suspicious lesions or rashes  NEURO: Normal strength and tone, mentation intact and speech normal  Comprehensive back pain exam:  Lower extremity strength functional and equal on both sides and Straight leg raise negative bilaterally  PSYCH: mentation appears normal, affect normal/bright            Signed Electronically by: Kristen M. Kehr, PA-C

## 2025-06-27 ENCOUNTER — MYC MEDICAL ADVICE (OUTPATIENT)
Dept: FAMILY MEDICINE | Facility: CLINIC | Age: 60
End: 2025-06-27

## 2025-06-27 ENCOUNTER — ANCILLARY PROCEDURE (OUTPATIENT)
Dept: ULTRASOUND IMAGING | Facility: CLINIC | Age: 60
End: 2025-06-27
Attending: PHYSICIAN ASSISTANT
Payer: COMMERCIAL

## 2025-06-27 DIAGNOSIS — M79.662 PAIN OF LEFT CALF: Primary | ICD-10-CM

## 2025-06-27 DIAGNOSIS — M54.31 RIGHT SIDED SCIATICA: ICD-10-CM

## 2025-06-27 DIAGNOSIS — M79.662 PAIN OF LEFT CALF: ICD-10-CM

## 2025-06-27 PROCEDURE — 93971 EXTREMITY STUDY: CPT | Mod: LT | Performed by: RADIOLOGY

## 2025-06-30 ENCOUNTER — RESULTS FOLLOW-UP (OUTPATIENT)
Dept: FAMILY MEDICINE | Facility: CLINIC | Age: 60
End: 2025-06-30

## 2025-07-11 ENCOUNTER — ANCILLARY PROCEDURE (OUTPATIENT)
Dept: GENERAL RADIOLOGY | Facility: CLINIC | Age: 60
End: 2025-07-11
Attending: PHYSICIAN ASSISTANT
Payer: COMMERCIAL

## 2025-07-11 DIAGNOSIS — M25.562 ACUTE PAIN OF LEFT KNEE: ICD-10-CM

## 2025-07-11 PROCEDURE — 73562 X-RAY EXAM OF KNEE 3: CPT | Mod: TC | Performed by: RADIOLOGY

## 2025-07-12 ENCOUNTER — MYC MEDICAL ADVICE (OUTPATIENT)
Dept: FAMILY MEDICINE | Facility: CLINIC | Age: 60
End: 2025-07-12
Payer: COMMERCIAL

## 2025-07-15 SDOH — HEALTH STABILITY: PHYSICAL HEALTH: ON AVERAGE, HOW MANY MINUTES DO YOU ENGAGE IN EXERCISE AT THIS LEVEL?: 0 MIN

## 2025-07-15 SDOH — HEALTH STABILITY: PHYSICAL HEALTH: ON AVERAGE, HOW MANY DAYS PER WEEK DO YOU ENGAGE IN MODERATE TO STRENUOUS EXERCISE (LIKE A BRISK WALK)?: 0 DAYS

## 2025-07-18 ENCOUNTER — ANCILLARY PROCEDURE (OUTPATIENT)
Dept: GENERAL RADIOLOGY | Facility: CLINIC | Age: 60
End: 2025-07-18
Attending: FAMILY MEDICINE
Payer: COMMERCIAL

## 2025-07-18 ENCOUNTER — OFFICE VISIT (OUTPATIENT)
Dept: ORTHOPEDICS | Facility: CLINIC | Age: 60
End: 2025-07-18
Attending: PHYSICIAN ASSISTANT
Payer: COMMERCIAL

## 2025-07-18 DIAGNOSIS — M54.16 LUMBAR RADICULOPATHY: ICD-10-CM

## 2025-07-18 DIAGNOSIS — M54.16 LUMBAR RADICULOPATHY: Primary | ICD-10-CM

## 2025-07-18 DIAGNOSIS — M54.31 RIGHT SIDED SCIATICA: ICD-10-CM

## 2025-07-18 DIAGNOSIS — M25.562 ACUTE PAIN OF LEFT KNEE: ICD-10-CM

## 2025-07-18 DIAGNOSIS — M79.662 PAIN OF LEFT CALF: ICD-10-CM

## 2025-07-18 PROCEDURE — 20610 DRAIN/INJ JOINT/BURSA W/O US: CPT | Mod: LT | Performed by: FAMILY MEDICINE

## 2025-07-18 PROCEDURE — 1125F AMNT PAIN NOTED PAIN PRSNT: CPT | Performed by: FAMILY MEDICINE

## 2025-07-18 PROCEDURE — 99204 OFFICE O/P NEW MOD 45 MIN: CPT | Mod: 25 | Performed by: FAMILY MEDICINE

## 2025-07-18 PROCEDURE — 72100 X-RAY EXAM L-S SPINE 2/3 VWS: CPT | Performed by: STUDENT IN AN ORGANIZED HEALTH CARE EDUCATION/TRAINING PROGRAM

## 2025-07-18 RX ADMIN — LIDOCAINE HYDROCHLORIDE 4 ML: 10 INJECTION, SOLUTION INFILTRATION; PERINEURAL at 11:34

## 2025-07-18 RX ADMIN — TRIAMCINOLONE ACETONIDE 40 MG: 40 INJECTION, SUSPENSION INTRA-ARTICULAR; INTRAMUSCULAR at 11:34

## 2025-07-18 ASSESSMENT — PAIN SCALES - GENERAL: PAINLEVEL_OUTOF10: MODERATE PAIN (6)

## 2025-07-18 NOTE — LETTER
7/18/2025      Isaura Mcmahan  [address] [place] [address]      Dear Colleague,    Thank you for referring your patient, Isaura Mcmahan, to the [facility] [place]. Please see a copy of my visit note below.    CHIEF COMPLAINT:  Pain and New Patient of the Left Lower Leg (Left knee pain - 6 weeks worse lateley) and Pain and New Patient of the Left Knee       HISTORY OF PRESENT ILLNESS  Ms. Mcmahan is a 60 year old female who presents to clinic today with pain in her left leg and knee.  Isaura suffered a fall directly onto her knee about 6 weeks ago.  Shortly afterwards she was on vacation and noticed that not only her knee was bothering her but that she was experiencing pain throughout the entirety of her leg.  This radiated upward and downward from her knee.  At times this is burning and sharp and acute, and at times dull.  This will likely happen multiple times a day, worsen when she is on her feet.  Does get better if she is resting.  She does have a history of right-sided sciatica.  She has been seeing a chiropractor, intermittent treatment has been helpful.      Additional history: as documented    MEDICAL HISTORY  Patient Active Problem List   Diagnosis    Hyperlipidemia LDL goal <130    Hypertension goal BP (blood pressure) < 140/90    Obesity    CARON (obstructive sleep apnea)- mild (AHI 10)    Bunion, right    Prediabetes       Current Outpatient Medications   Medication Sig Dispense Refill    amLODIPine (NORVASC) 2.5 MG tablet Take 1 tablet (2.5 mg) by mouth daily. 90 tablet 0    atorvastatin (LIPITOR) 20 MG tablet Take 1 tablet (20 mg) by mouth daily. 90 tablet 0    benzonatate (TESSALON) 200 MG capsule Take 1 capsule (200 mg) by mouth 3 times daily as needed for cough. 21 capsule 0    benzoyl peroxide 5 % external liquid Use topically as body wash few times weekly for prevention 236 mL 3    clindamycin (CLEOCIN T) 1 % external lotion Apply topically 2 times  daily. 60 mL 3    clobetasol propionate (TEMOVATE) 0.05 % external cream Apply to affected areas on the hands twice daily for up to 2-3 weeks. Thereafter, may use as needed for flares. Do not apply to face or skin folds. 60 g 1    hydrochlorothiazide (HYDRODIURIL) 25 MG tablet Take 1 tablet (25 mg) by mouth daily. 90 tablet 3    ibuprofen (ADVIL/MOTRIN) 800 MG tablet Take 1 tablet (800 mg) by mouth every 8 hours as needed for moderate pain. 30 tablet 0    potassium chloride ER (K-TAB/KLOR-CON) 10 MEQ CR tablet Take 1 tablet (10 mEq) by mouth daily at 2 pm. 90 tablet 0    scopolamine (TRANSDERM) 1 MG/3DAYS 72 hr patch Place 1 patch onto the skin every 72 hours 10 patch 2       Allergies   Allergen Reactions    Amoxicillin Hives    Ciprofloxacin Swelling     Eyelid swelling after drops       Family History   Problem Relation Age of Onset    Hypertension Mother     Hyperlipidemia Mother     Other Cancer Mother         Lung and Brain    Cancer Father         prostate    Prostate Cancer Father     Other Cancer Father         bone    Diabetes Maternal Grandmother     Cancer Brother         testicular    Other Cancer Brother         testicular/testicular    Hypertension Brother     Hyperlipidemia Brother     Prostate Cancer Brother     Other Cancer Brother         Testicular     Additional medical/Social/Surgical histories reviewed in [facility] and updated as appropriate.        PHYSICAL EXAM  General  - normal appearance, in no obvious distress  Musculoskeletal -left knee  - stance: normal gait without limp  - inspection: no swelling or effusion  - palpation: no joint line tenderness, patella and patellar tendon non-tender  - ROM: 135 degrees flexion, -5 degrees extension, not painful, normal actively and passively compared to contralateral  - strength: 5/5 in flexion, 5/5 in extension  - special tests:  (-) Lachman  (-) Beatriz  (-) varus at 0 and 30 degrees flexion  (-) valgus at 0 and 30 degrees flexion    Neuro  - no  sensory or motor deficit, grossly normal coordination, normal muscle tone  Skin  - no ecchymosis, erythema, warmth, or induration, no obvious rash        ASSESSMENT & PLAN  Ms. Mcmahan is a 60 year old female who presents to clinic today with left knee and leg pain.    I ordered and independently reviewed an xray of her lumbar spine, this reveals mild degenerative change.    We did discuss that she very well may be experiencing some symptoms from her knee, although it is difficult to exclude lumbar radiculopathy as a likely cause or at least contributor.    Through shared decision-making we did decide to inject her knee today on a diagnostic basis.  She is going to let me know how she is doing next week.  If this injection does not help her whatsoever I would likely recommend that we work up her lumbar spine.    It was a pleasure seeing Isaura today.    Shane Jones DO, [facility]  Primary Care Sports Medicine    Greater than 20 minutes were spent on the day of visit reviewing records, in direct face-to-face consultation and exam, and documentation independent of the procedure.       This note was constructed using Dragon dictation software, please excuse any minor errors in spelling, grammar, or syntax.      Large Joint Injection/Arthocentesis: L knee joint    Date/Time: 7/18/2025 11:34 AM    Performed by: Shane Jones DO  Authorized by: Shane Jones DO    Indications:  Pain  Needle Size:  22 G  Guidance: landmark guided    Approach:  Lateral  Location:  Knee      Medications:  40 mg triamcinolone 40 MG/ML; 4 mL lidocaine 1 %  Outcome:  Tolerated well, no immediate complications  Procedure discussed: discussed risks, benefits, and alternatives    Consent Given by:  Patient  Timeout: timeout called immediately prior to procedure    Prep: patient was prepped and draped in usual sterile fashion       PROCEDURE    Knee Injection - Intraarticular  The patient was informed of the risks and the benefits of the  procedure and a written consent was signed.  The patient's left knee was prepped with chlorhexidine in sterile fashion.   40 mg of triamcinolone suspension was drawn up into a 5 mL syringe with 4 mL of 1% lidocaine.  Injection was performed using substerile technique.  A 1.5-inch 22-gauge needle was used to enter the lateral aspect of the knee.  Injection performed successfully without difficulty.  There were no complications. The patient tolerated the procedure well. There was negligible bleeding.       Again, thank you for allowing me to participate in the care of your patient.      Sincerely,      Shane Jones DO  Electronically signed

## 2025-07-18 NOTE — PROGRESS NOTES
CHIEF COMPLAINT:  Pain and New Patient of the Left Lower Leg (Left knee pain - 6 weeks worse lateley) and Pain and New Patient of the Left Knee       HISTORY OF PRESENT ILLNESS  Ms. Mcmahan is a 60 year old female who presents to clinic today with pain in her left leg and knee.  Isaura suffered a fall directly onto her knee about 6 weeks ago.  Shortly afterwards she was on vacation and noticed that not only her knee was bothering her but that she was experiencing pain throughout the entirety of her leg.  This radiated upward and downward from her knee.  At times this is burning and sharp and acute, and at times dull.  This will likely happen multiple times a day, worsen when she is on her feet.  Does get better if she is resting.  She does have a history of right-sided sciatica.  She has been seeing a chiropractor, intermittent treatment has been helpful.        Additional history: as documented    MEDICAL HISTORY  Patient Active Problem List   Diagnosis    Hyperlipidemia LDL goal <130    Hypertension goal BP (blood pressure) < 140/90    Obesity    CARON (obstructive sleep apnea)- mild (AHI 10)    Bunion, right    Prediabetes       Current Outpatient Medications   Medication Sig Dispense Refill    amLODIPine (NORVASC) 2.5 MG tablet Take 1 tablet (2.5 mg) by mouth daily. 90 tablet 0    atorvastatin (LIPITOR) 20 MG tablet Take 1 tablet (20 mg) by mouth daily. 90 tablet 0    benzonatate (TESSALON) 200 MG capsule Take 1 capsule (200 mg) by mouth 3 times daily as needed for cough. 21 capsule 0    benzoyl peroxide 5 % external liquid Use topically as body wash few times weekly for prevention 236 mL 3    clindamycin (CLEOCIN T) 1 % external lotion Apply topically 2 times daily. 60 mL 3    clobetasol propionate (TEMOVATE) 0.05 % external cream Apply to affected areas on the hands twice daily for up to 2-3 weeks. Thereafter, may use as needed for flares. Do not apply to face or skin folds. 60 g 1    hydrochlorothiazide  (HYDRODIURIL) 25 MG tablet Take 1 tablet (25 mg) by mouth daily. 90 tablet 3    ibuprofen (ADVIL/MOTRIN) 800 MG tablet Take 1 tablet (800 mg) by mouth every 8 hours as needed for moderate pain. 30 tablet 0    potassium chloride ER (K-TAB/KLOR-CON) 10 MEQ CR tablet Take 1 tablet (10 mEq) by mouth daily at 2 pm. 90 tablet 0    scopolamine (TRANSDERM) 1 MG/3DAYS 72 hr patch Place 1 patch onto the skin every 72 hours 10 patch 2       Allergies   Allergen Reactions    Amoxicillin Hives    Ciprofloxacin Swelling     Eyelid swelling after drops       Family History   Problem Relation Age of Onset    Hypertension Mother     Hyperlipidemia Mother     Other Cancer Mother         Lung and Brain    Cancer Father         prostate    Prostate Cancer Father     Other Cancer Father         bone    Diabetes Maternal Grandmother     Cancer Brother         testicular    Other Cancer Brother         testicular/testicular    Hypertension Brother     Hyperlipidemia Brother     Prostate Cancer Brother     Other Cancer Brother         Testicular       Additional medical/Social/Surgical histories reviewed in [facility] and updated as appropriate.        PHYSICAL EXAM  General  - normal appearance, in no obvious distress  Musculoskeletal -left knee  - stance: normal gait without limp  - inspection: no swelling or effusion  - palpation: no joint line tenderness, patella and patellar tendon non-tender  - ROM: 135 degrees flexion, -5 degrees extension, not painful, normal actively and passively compared to contralateral  - strength: 5/5 in flexion, 5/5 in extension  - special tests:  (-) Lachman  (-) Beatriz  (-) varus at 0 and 30 degrees flexion  (-) valgus at 0 and 30 degrees flexion    Neuro  - no sensory or motor deficit, grossly normal coordination, normal muscle tone  Skin  - no ecchymosis, erythema, warmth, or induration, no obvious rash           ASSESSMENT & PLAN  Ms. Mcmahan is a 60 year old female who presents to clinic today with left  knee and leg pain.    I ordered and independently reviewed an xray of her lumbar spine, this reveals mild degenerative change.    We did discuss that she very well may be experiencing some symptoms from her knee, although it is difficult to exclude lumbar radiculopathy as a likely cause or at least contributor.    Through shared decision-making we did decide to inject her knee today on a diagnostic basis.  She is going to let me know how she is doing next week.  If this injection does not help her whatsoever I would likely recommend that we work up her lumbar spine.    It was a pleasure seeing Isaura today.    Shane Jones DO, [facility]  Primary Care Sports Medicine    Greater than 20 minutes were spent on the day of visit reviewing records, in direct face-to-face consultation and exam, and documentation independent of the procedure.       This note was constructed using Dragon dictation software, please excuse any minor errors in spelling, grammar, or syntax.    Large Joint Injection/Arthocentesis: L knee joint    Date/Time: 7/18/2025 11:34 AM    Performed by: Shane Jones DO  Authorized by: Shane Jones DO    Indications:  Pain  Needle Size:  22 G  Guidance: landmark guided    Approach:  Lateral  Location:  Knee      Medications:  40 mg triamcinolone 40 MG/ML; 4 mL lidocaine 1 %  Outcome:  Tolerated well, no immediate complications  Procedure discussed: discussed risks, benefits, and alternatives    Consent Given by:  Patient  Timeout: timeout called immediately prior to procedure    Prep: patient was prepped and draped in usual sterile fashion       PROCEDURE    Knee Injection - Intraarticular  The patient was informed of the risks and the benefits of the procedure and a written consent was signed.  The patient's left knee was prepped with chlorhexidine in sterile fashion.   40 mg of triamcinolone suspension was drawn up into a 5 mL syringe with 4 mL of 1% lidocaine.  Injection was performed using  substerile technique.  A 1.5-inch 22-gauge needle was used to enter the lateral aspect of the knee.  Injection performed successfully without difficulty.  There were no complications. The patient tolerated the procedure well. There was negligible bleeding.

## 2025-07-18 NOTE — NURSING NOTE
Chief Complaint   Patient presents with    Left Lower Leg - Pain, New Patient     Left knee pain - 6 weeks worse lateley    Left Knee - Pain, New Patient       There were no vitals filed for this visit.    There is no height or weight on file to calculate BMI.      PAULA White NREMT

## 2025-07-19 RX ORDER — LIDOCAINE HYDROCHLORIDE 10 MG/ML
4 INJECTION, SOLUTION INFILTRATION; PERINEURAL
Status: COMPLETED | OUTPATIENT
Start: 2025-07-18 | End: 2025-07-18

## 2025-07-19 RX ORDER — TRIAMCINOLONE ACETONIDE 40 MG/ML
40 INJECTION, SUSPENSION INTRA-ARTICULAR; INTRAMUSCULAR
Status: COMPLETED | OUTPATIENT
Start: 2025-07-18 | End: 2025-07-18

## 2025-07-28 ENCOUNTER — MYC MEDICAL ADVICE (OUTPATIENT)
Dept: FAMILY MEDICINE | Facility: CLINIC | Age: 60
End: 2025-07-28
Payer: COMMERCIAL

## 2025-07-29 NOTE — TELEPHONE ENCOUNTER
Forms/Letter     Verify patient name and date of birth.  Was this done?: Yes    Verify Photo ID needed of person  item.  Name of person picking up: miriam Hernandez

## 2025-07-29 NOTE — TELEPHONE ENCOUNTER
Spoke with patient and confirmed she will  form at [place] .  Copy sent to scanning, copy placed in black folder.  Carmina BERNARD    [facility]

## 2025-07-30 NOTE — TELEPHONE ENCOUNTER
Forms/Letter     Verify patient name and date of birth.  Was this done?: Yes    Verify Photo ID needed of person  item.  Name of person picking up: Isaura Banks

## 2025-08-07 ENCOUNTER — ALLIED HEALTH/NURSE VISIT (OUTPATIENT)
Dept: NURSING | Facility: CLINIC | Age: 60
End: 2025-08-07
Payer: COMMERCIAL

## 2025-08-07 DIAGNOSIS — M25.562 LEFT KNEE PAIN, UNSPECIFIED CHRONICITY: ICD-10-CM

## 2025-08-07 DIAGNOSIS — M17.12 ARTHRITIS OF LEFT KNEE: Primary | ICD-10-CM

## 2025-08-07 DIAGNOSIS — M23.307 DEGENERATIVE TEAR OF MENISCUS OF LEFT KNEE: ICD-10-CM

## 2025-08-25 ENCOUNTER — RESULTS FOLLOW-UP (OUTPATIENT)
Dept: ORTHOPEDICS | Facility: CLINIC | Age: 60
End: 2025-08-25

## 2025-08-25 ENCOUNTER — ANCILLARY PROCEDURE (OUTPATIENT)
Dept: MRI IMAGING | Facility: CLINIC | Age: 60
End: 2025-08-25
Attending: FAMILY MEDICINE
Payer: COMMERCIAL

## 2025-08-25 DIAGNOSIS — M54.16 LUMBAR RADICULOPATHY: ICD-10-CM

## 2025-08-25 DIAGNOSIS — M17.12 PRIMARY OSTEOARTHRITIS OF LEFT KNEE: Primary | ICD-10-CM

## 2025-08-25 DIAGNOSIS — M54.31 RIGHT SIDED SCIATICA: ICD-10-CM

## 2025-08-25 DIAGNOSIS — M25.562 ACUTE PAIN OF LEFT KNEE: ICD-10-CM

## 2025-08-25 PROCEDURE — 72148 MRI LUMBAR SPINE W/O DYE: CPT | Mod: GC | Performed by: RADIOLOGY

## 2025-08-25 PROCEDURE — 73721 MRI JNT OF LWR EXTRE W/O DYE: CPT | Mod: LT | Performed by: STUDENT IN AN ORGANIZED HEALTH CARE EDUCATION/TRAINING PROGRAM
